# Patient Record
Sex: FEMALE | Race: WHITE | Employment: OTHER | URBAN - METROPOLITAN AREA
[De-identification: names, ages, dates, MRNs, and addresses within clinical notes are randomized per-mention and may not be internally consistent; named-entity substitution may affect disease eponyms.]

---

## 2018-07-10 ENCOUNTER — HOSPITAL ENCOUNTER (OUTPATIENT)
Dept: PHYSICAL THERAPY | Age: 73
Discharge: HOME | End: 2018-07-10
Payer: MEDICARE

## 2018-07-10 PROCEDURE — G8984 CARRY CURRENT STATUS: HCPCS

## 2018-07-10 PROCEDURE — 97110 THERAPEUTIC EXERCISES: CPT

## 2018-07-10 PROCEDURE — 97161 PT EVAL LOW COMPLEX 20 MIN: CPT

## 2018-07-10 PROCEDURE — 97530 THERAPEUTIC ACTIVITIES: CPT

## 2018-07-10 PROCEDURE — G8985 CARRY GOAL STATUS: HCPCS

## 2018-07-10 NOTE — PROGRESS NOTES
PT DAILY TREATMENT NOTE - [facility]     Patient Name: Reed Meade  Date:7/10/2018  : 1945  [x]  Patient  Verified  Payor: [place] MED[place] / Plan: [place] MEDICARE PART A & B / Product Type: Medicare /    In time:355  Out time:435  Total Treatment Time (min): 40  Total Timed Codes (min): 23  1:1 Treatment Time ( only): 40   Visit #: 1 of 12    Treatment Area: Right shoulder pain [M25.511]    SUBJECTIVE  Pain Level (0-10 scale): 0/10 seated at rest  Any medication changes, allergies to medications, adverse drug reactions, diagnosis change, or new procedure performed?: [x] No    [] Yes (see summary sheet for update)  Subjective functional status/changes:   [] No changes reported  See POC    OBJECTIVE    Modality rationale:    Min Type Additional Details    [] Estim:  []Unatt       []IFC  []Premod                        []Other:  []w/ice   []w/heat  Position:  Location:    [] Estim: []Att    []TENS instruct  []NMES                    []Other:  []w/US   []w/ice   []w/heat  Position:  Location:    []  Traction: [] Cervical       []Lumbar                       [] Prone          []Supine                       []Intermittent   []Continuous Lbs:  [] before manual  [] after manual    []  Ultrasound: []Continuous   [] Pulsed                           []1MHz   []3MHz W/cm2:  Location:    []  Iontophoresis with dexamethasone         Location: [] Take home patch   [] In clinic    []  Ice     []  heat  []  Ice massage  []  Laser   []  Anodyne Position:  Location:    []  Laser with stim  []  Other:  Position:  Location:    []  Vasopneumatic Device Pressure:       [] lo [] med [] hi   Temperature: [] lo [] med [] hi   [] Skin assessment post-treatment:  []intact []redness- no adverse reaction    []redness  adverse reaction:     17 min [x]Eval                  []Re-Eval       10 min Therapeutic Exercise:  [] See flow sheet :issued and reviewed initial HEP   Rationale: increase ROM and decrease pain to improve the patients ability to restore normal joint mobility for ADL's    13 min Therapeutic Activity:  []  See flow sheet : reviewed use of sling, discussed post op restrictions/precautions, reviewed positiioning   Rationale: decrease pain, honor healing process/protect surgical site  to improve the patients ability in prep for active use      min Neuromuscular Re-education:  []  See flow sheet :        min Manual Therapy:          min Gait Training:  ___ feet with ___ device on level surfaces with ___ level of assist   Rationale: With   [] TE   [] TA   [] neuro   [] other: Patient Education: [x] Review HEP    [] Progressed/Changed HEP based on:   [] positioning   [] body mechanics   [] transfers   [] heat/ice application    [] other:      Other Objective/Functional Measures:   Physical Therapy Evaluation - Shoulder  Pt s/p right shoulder arthroscopic surgery to include large RC tear repair, extensive debridement of biceps tendon rupture, and acromioplasty on 6/26/18 as a result of fall out of attic on 4/18/18. Note pt had right shoulder pain prior to fall and fall exacerbated symptoms Pt right hand dominant.   PLOF: chronic right shoulder pain with ADL's  Present Functional Limitations: cooking, cleaning, driving, bathing, dressing, reaching    Posture: [] Poor    [x] Fair    [] Good    Describe:    ROM:  [] Unable to assess at this time                                           AROM                                                              PROM   Left Right  Left Right   Flexion 155  Flexion  90   Extension   Extension     Scaption/  Scaption/ABD  100   ER @ 0 Degrees   ER @ 0 Degrees     ER @ 90 Degrees 70  ER @ 25 Degrees  0   IR @ 90 Degrees T10 level  IR @ 25 Degrees  70     End Feel / Painful Arc:    Strength:   [x] Unable to assess at this time secondary to post op status/MD order                                                                            L (1-5) R (1-5) Pain   Flexors   [] Yes   [] No Abductors   [] Yes   [] No   External Rotators   [] Yes   [] No   Internal Rotators   [] Yes   [] No   Supraspinatus   [] Yes   [] No   Serratus Anterior   [] Yes   [] No   Lower Trapezius   [] Yes   [] No   Elbow Flexion   [] Yes   [] No   Elbow Extension   [] Yes   [] No       Scapulohumoral Control / Rhythm:  Able to eccentrically lower with good control? Left: [x] Yes   [] No     Right: [] Yes   [x] No    Accessory Motions:    Palpation  [] Min  [] Mod  [] Severe    Location:  [] Min  [] Mod  [] Severe    Location:  [] Min  [] Mod  [] Severe    Location:    Optional Tests:    Sensation Left Right Reflexes Left Right   Biceps (C5)   Biceps (C5)     Moy Radial(C6-7)   Brachioradialis (C6)     Moy Ulnar(C8-T1)   Triceps (C7)       Adson's Test  [] Pos   [] Neg Yergason's Test [] Pos   [] Neg  Sandi's Test  [] Pos   [] Neg Hidalgo's Sign [] Pos   [] Neg  Neer's Test  [] Pos   [] Neg Clunk Test  [] Pos   [] Neg  Hawkin's Test  [] Pos   [] Neg AC Joint  [] Pos   [] Neg  Speed's Test  [] Pos   [] Neg SC Joint  [] Pos   [] Neg  Empty Can  [] Pos   [] Neg Pectoral Tightness [] Pos   [] Neg  Anterior Apprehension [] Pos   [] Neg   Posterior Apprehension [] Pos   [] Neg      Other Tests / Comments:          Pain Level (0-10 scale) post treatment: 0/10 at rest    ASSESSMENT/Changes in Function: see POC    Patient will continue to benefit from skilled PT services to modify and progress therapeutic interventions, address ROM deficits, address strength deficits, analyze and address soft tissue restrictions, analyze and cue movement patterns, analyze and modify body mechanics/ergonomics and assess and modify postural abnormalities to attain remaining goals.      [x]  See Plan of Care  []  See progress note/recertification  []  See Discharge Summary         Progress towards goals / Updated goals:  See POC    PLAN  []  Upgrade activities as tolerated     [x]  Continue plan of care  []  Update interventions per flow sheet []  Discharge due to:_  [x]  Other: PROM right shoulder only, manual techniques, mod prn     Sammi Govea, PT 7/10/2018  3:14 PM    Future Appointments  Date Time Provider Maxx Daniels   7/10/2018 4:00 PM Sammi Govea, PT [facility]

## 2018-07-10 NOTE — PROGRESS NOTES
[facility] at [address] [place] [address]  Phone: [phone]   Fax: [phone]    Plan of Care/ Statement of Necessity for Physical Therapy Services    Patient name: Saturnino Santiago Start of Care: 7/10/2018   Referral source: Wilman Broderick MD : 1945    Medical Diagnosis: Right shoulder pain [M25.511]   Onset Date:18 (DOS)    Treatment Diagnosis: right shoulder pain   Prior Hospitalization: see medical history Provider#: [id number]   Medications: Verified on Patient summary List    Comorbidities: DM, arthritis, HTN   Prior Level of Function: chronic right shoulder pain with ADL's      The Plan of Care and following information is based on the information from the initial evaluation. Assessment/ key information: Pt is a 72 yo female presenting to clinic s/p right shoulder arthroscopic surgery to include large RC tear repair, extensive debridement of biceps tendon rupture, and acromioplasty on 18 as a result of fall out of attic on 18. Note pt had right shoulder pain prior to fall and fall exacerbated symptoms Pt right hand dominant. On exam, pt has decreased right shoulder PROM, decreased shoulder strength and decreased functional use of right UE secondary to post op status. Signs/symptoms consistent with post surgical status. Pt would benefit from skilled PT intervention to address the findings.     Evaluation Complexity History MEDIUM  Complexity : 1-2 comorbidities / personal factors will impact the outcome/ POC ; Examination MEDIUM Complexity : 3 Standardized tests and measures addressing body structure, function, activity limitation and / or participation in recreation  ;Presentation LOW Complexity : Stable, uncomplicated  ;Clinical Decision Making MEDIUM Complexity : FOTO score of 26-74  Overall Complexity Rating: MEDIUM  Problem List: pain affecting function, decrease ROM, decrease strength, decrease ADL/ functional abilitiies, decrease activity tolerance and decrease flexibility/ joint mobility   Treatment Plan may include any combination of the following: Therapeutic exercise, Therapeutic activities, Neuromuscular re-education, Physical agent/modality, Manual therapy and Patient education  Patient / Family readiness to learn indicated by: asking questions, trying to perform skills and interest  Persons(s) to be included in education: patient (P)  Barriers to Learning/Limitations: None  Patient Goal (s): regain functional use of arm  Patient Self Reported Health Status: good  Rehabilitation Potential: good    Short Term Goals: To be accomplished in 2 weeks:  1. Patient will be independent and compliant with HEP to achieve other goals. Status at eval: issued and reviewed initial HEP  2. Increase right shoulder PROM flex >/= 130 to restore normal joint mobility for ADL's. Status at eval: 90  3. Increase right shoulder PROM ER >/= 30 to restore normal joint mobility for ADL's. Status at eval: 0 (@25)  4. Increase right shoulder PROM IR >/= 80 to restore normal joint mobility for ADL's. Status at eval: 70 (@25)    Long Term Goals: To be accomplished in 4 weeks:  1. Improve FOTO score by 15 points to indicate decreased pain with ADL's. Status at eval: assess on 2nd visit, FOTO system down at time of eval  2. Increase right shoulder PROM flex >/= 160 to restore normal joint mobility for ADL's. Status at eval: 90  3. Increase right shoulder PROM ER >/= 60 to restore normal joint mobility for ADL's. Status at eval: 0 (@25)  4. Progress with active and strengthening phase per MD protocol. Status at eval: PROM right shoulder only  Frequency / Duration: Patient to be seen 3 times per week for 4 weeks.     Patient/ Caregiver education and instruction: Diagnosis, prognosis, self care, activity modification, brace/ splint application, exercises and other positioning   [x]  Plan of care has been reviewed with PTA    G-Codes (GP)  Carry   Current CL= 60-79%    Goal  CJ= 20-39%    The severity rating is based on clinical judgment and the FOTO score. Certification Period: 7/10/18 - 9/7/18  Eloise Plata, PT 7/10/2018 3:19 PM  _____________________________________________________________________  I certify that the above Therapy Services are being furnished while the patient is under my care. I agree with the treatment plan and certify that this therapy is necessary.     Physician's Signature:____________________  Date:__________Time:______    Please sign and return to   [facility] at [address] [place] [address]  Phone: [phone]   Fax: [phone]

## 2018-07-13 ENCOUNTER — HOSPITAL ENCOUNTER (OUTPATIENT)
Dept: PHYSICAL THERAPY | Age: 73
Discharge: HOME | End: 2018-07-13
Payer: MEDICARE

## 2018-07-13 PROCEDURE — 97112 NEUROMUSCULAR REEDUCATION: CPT

## 2018-07-13 PROCEDURE — 97140 MANUAL THERAPY 1/> REGIONS: CPT

## 2018-07-13 PROCEDURE — 97016 VASOPNEUMATIC DEVICE THERAPY: CPT

## 2018-07-13 NOTE — PROGRESS NOTES
PT DAILY TREATMENT NOTE - [facility]     Patient Name: Valentino Morning  Date:2018  : 1945  [x]  Patient  Verified  Payor: Gary Signs / Plan: [place] MEDICARE PART A & B / Product Type: Medicare /    In time:1030  Out time:1110  Total Treatment Time (min): 40  Total Timed Codes (min): 40  1:1 Treatment Time ( [address] only): 30   Visit #: 2 of 12    Treatment Area: Right shoulder pain [M25.511]    SUBJECTIVE  Pain Level (0-10 scale): 0/10  Any medication changes, allergies to medications, adverse drug reactions, diagnosis change, or new procedure performed?: [x] No    [] Yes (see summary sheet for update)  Subjective functional status/changes:   [] No changes reported  Pt reports that she is getting her ice machine today.     OBJECTIVE    Modality rationale: decrease inflammation and decrease pain to improve the patients ability to compelte ADLs   Min Type Additional Details    [] Estim:  []Unatt       []IFC  []Premod                        []Other:  []w/ice   []w/heat  Position:  Location:    [] Estim: []Att    []TENS instruct  []NMES                    []Other:  []w/US   []w/ice   []w/heat  Position:  Location:    []  Traction: [] Cervical       []Lumbar                       [] Prone          []Supine                       []Intermittent   []Continuous Lbs:  [] before manual  [] after manual    []  Ultrasound: []Continuous   [] Pulsed                           []1MHz   []3MHz W/cm2:  Location:    []  Iontophoresis with dexamethasone         Location: [] Take home patch   [] In clinic    []  Ice     []  heat  []  Ice massage  []  Laser   []  Anodyne Position:  Location:    []  Laser with stim  []  Other:  Position:  Location:   10 [x]  Vasopneumatic Device Pressure:       [] lo [x] med [] hi   Temperature: [] lo [x] med [] hi   [x] Skin assessment post-treatment:  [x]intact [x]redness- no adverse reaction    []redness  adverse reaction:       20 min Neuromuscular Re-education:  []  See flow sheet :   Rationale: increase ROM, improve coordination and increase proprioception  to improve the patients ability to complete ADLs    10 min Manual Therapy:  PROM, MFR to anterior/posterior  Right shoulder   Rationale: decrease pain, increase ROM, increase tissue extensibility, decrease edema  and decrease trigger points to complete ADls          With   [] TE   [] TA   [] neuro   [] other: Patient Education: [x] Review HEP    [] Progressed/Changed HEP based on:   [] positioning   [] body mechanics   [] transfers   [] heat/ice application    [] other:      Other Objective/Functional Measures:     Pain Level (0-10 scale) post treatment:0/10    ASSESSMENT/Changes in Function:Pt demonstrates edema and immobility in right shoulder. She required cueing for form with pendulums, reducing active motion. Patient will continue to benefit from skilled PT services to address functional mobility deficits, address ROM deficits, address strength deficits, analyze and address soft tissue restrictions, analyze and cue movement patterns, analyze and modify body mechanics/ergonomics and assess and modify postural abnormalities to attain remaining goals. []  See Plan of Care  []  See progress note/recertification  []  See Discharge Summary         Progress towards goals / Updated goals:  Short Term Goals: To be accomplished in 2 weeks:  1. Patient will be independent and compliant with HEP to achieve other goals. Status at eval: issued and reviewed initial HEP  Current: reviewed  2. Increase right shoulder PROM flex >/= 130 to restore normal joint mobility for ADL's. Status at eval: 90  3. Increase right shoulder PROM ER >/= 30 to restore normal joint mobility for ADL's. Status at eval: 0 (@25)  4. Increase right shoulder PROM IR >/= 80 to restore normal joint mobility for ADL's. Status at eval: 70 (@25)     Long Term Goals: To be accomplished in 4 weeks:  1. Improve FOTO score by 15 points to indicate decreased pain with ADL's.   Status at eval: assess on 2nd visit, FOTO system down at time of eval  2. Increase right shoulder PROM flex >/= 160 to restore normal joint mobility for ADL's. Status at eval: 90  3. Increase right shoulder PROM ER >/= 60 to restore normal joint mobility for ADL's. Status at eval: 0 (@25)  4. Progress with active and strengthening phase per MD protocol.   Status at eval: PROM right shoulder only  Current; Progressing    PLAN  []  Upgrade activities as tolerated     [x]  Continue plan of care  []  Update interventions per flow sheet       []  Discharge due to:_  []  Other:_      Elliott Vargas PTA 7/13/2018  10:11 AM    Future Appointments  Date Time Provider Maxx Daniels   7/13/2018 10:30 AM LUCY Cherry [facility]   7/16/2018 9:30 AM Cassidy [facility]   7/18/2018 10:00 AM [facility] PT [facility]   7/20/2018 3:30 PM Elliott Vargas PTA [facility]   7/24/2018 3:30 PM LUCY [facility]PTV[facility]   7/25/2018 9:00 AM Maria Elena Wall, PT [facility]   7/27/2018 10:45 AM LUCY [facility]   8/1/2018 9:15 AM LUCY [facility]PTV[facility]   8/3/2018 9:30 AM Elliott Vargas PTA [facility]

## 2018-07-16 ENCOUNTER — HOSPITAL ENCOUNTER (OUTPATIENT)
Dept: PHYSICAL THERAPY | Age: 73
Discharge: HOME | End: 2018-07-16
Payer: MEDICARE

## 2018-07-16 PROCEDURE — 97140 MANUAL THERAPY 1/> REGIONS: CPT | Performed by: PHYSICAL THERAPIST

## 2018-07-16 PROCEDURE — 97112 NEUROMUSCULAR REEDUCATION: CPT | Performed by: PHYSICAL THERAPIST

## 2018-07-16 NOTE — PROGRESS NOTES
PT DAILY TREATMENT NOTE - [facility]     Patient Name: John Sheppard  Date:2018  : 1945  [x]  Patient  Verified  Payor: Abundio Drafts / Plan: [place] MEDICARE PART A & B / Product Type: Medicare /    In time:9:32  Out time:10:35  Total Treatment Time (min): 45  Total Timed Codes (min): 35  1:1 Treatment Time ( [address] only): 35  Visit #: 3 of 12    Treatment Area: Right shoulder pain [M25.511]    SUBJECTIVE  Pain Level (0-10 scale): 0  Any medication changes, allergies to medications, adverse drug reactions, diagnosis change, or new procedure performed?: [x] No    [] Yes (see summary sheet for update)  Subjective functional status/changes:   [] No changes reported  Doing well but still pain from surgery. Still sleeps in recliner - cannot get comfortable in bed. Notes left sided \"crawling\" sensation into thumb and index.       OBJECTIVE    Modality rationale: decrease inflammation and decrease pain to improve the patients ability to compelte ADLs   Min Type Additional Details     [] Estim:  []Unatt       []IFC  []Premod                        []Other:  []w/ice   []w/heat  Position:  Location:     [] Estim: []Att    []TENS instruct  []NMES                    []Other:  []w/US   []w/ice   []w/heat  Position:  Location:     []  Traction: [] Cervical       []Lumbar                       [] Prone          []Supine                       []Intermittent   []Continuous Lbs:  [] before manual  [] after manual     []  Ultrasound: []Continuous   [] Pulsed                           []1MHz   []3MHz W/cm2:  Location:     []  Iontophoresis with dexamethasone         Location: [] Take home patch   [] In clinic     []  Ice     []  heat  []  Ice massage  []  Laser   []  Anodyne Position:  Location:     []  Laser with stim  []  Other:  Position:  Location:   10 [x]  Vasopneumatic Device Pressure:       [] lo [x] med [] hi   Temperature: [] lo [x] med [] hi   [x] Skin assessment post-treatment:  [x]intact [x]redness- no adverse reaction    []redness  adverse reaction:         20 min Neuromuscular Re-education:  []  See flow sheet :   Rationale: increase ROM, improve coordination and increase proprioception  to improve the patients ability to complete ADLs     15 min Manual Therapy:  scapular mobs in left SL, PROM with arm at side for ER/IR   Rationale: decrease pain, increase ROM, increase tissue extensibility, decrease edema  and decrease trigger points to complete ADls      With   [] TE   [] TA   [] neuro   [] other: Patient Education: [x] Review HEP    [] Progressed/Changed HEP based on:   [] positioning   [] body mechanics   [] transfers   [] heat/ice application    [] other:       Other Objective/Functional Measures:            PROM:  Shoulder flexion: 0-120; ER with arm at side:  Neutral        New onset left radicular sx - educated to correct posture and add cervical retraction - see HEP in chart     Pain Level (0-10 scale) post treatment:0/10     ASSESSMENT/Changes in Function:  Great progress with PROM shoulder flexion tp 120; still unable to sleep unless in recliner. Walking and staying as active as she can. Patient will continue to benefit from skilled PT services to address functional mobility deficits, address ROM deficits, address strength deficits, analyze and address soft tissue restrictions, analyze and cue movement patterns, analyze and modify body mechanics/ergonomics and assess and modify postural abnormalities to attain remaining goals.      []  See Plan of Care  []  See progress note/recertification  []  See Discharge Summary      Progress towards goals / Updated goals:  Short Term Goals: To be accomplished in 2 weeks:  1. Patient will be independent and compliant with HEP to achieve other goals. Status at eval: issued and reviewed initial HEP  Current: met  2. Increase right shoulder PROM flex >/= 130 to restore normal joint mobility for ADL's. Status at eval: 90  Current:120  3.  Increase right shoulder PROM ER >/= 30 to restore normal joint mobility for ADL's. Status at eval: 0 (@25)   Current:  0 at 25  4. Increase right shoulder PROM IR >/= 80 to restore normal joint mobility for ADL's. Status at eval: 70 (@25)  Current: 70 at 22      Long Term Goals: To be accomplished in 4 weeks:  1. Improve FOTO score by 15 points to indicate decreased pain with ADL's. Status at eval: assess on 2nd visit, FOTO system down at time of eval  2. Increase right shoulder PROM flex >/= 160 to restore normal joint mobility for ADL's. Status at eval: 90  3. Increase right shoulder PROM ER >/= 60 to restore normal joint mobility for ADL's. Status at eval: 0 (@25)  4. Progress with active and strengthening phase per MD protocol.   Status at eval: PROM right shoulder only  Current; Progressing     PLAN  [x]  Upgrade activities as tolerated     [x]  Continue plan of care  []  Update interventions per flow sheet       []  Discharge due to:_  []  Other:_  Check status with retraction and left arm sx  Tisha Vasques, PT 7/16/2018  10:35 AM    Future Appointments  Date Time Provider Maxx Daniels   7/18/2018 10:00 AM Cassidy [facility]   7/20/2018 3:30 PM LUCY Henning [facility]   7/24/2018 3:30 PM [facility]UCY [facility]   7/25/2018 9:00 AM JAYLEN Wood [facility]   7/27/2018 10:45 AM [facility]UCY [facility]   8/1/2018 9:15 AM [facility]UCY [facility]   8/3/2018 9:30 AM LUCY Hen[facility]

## 2018-07-18 ENCOUNTER — HOSPITAL ENCOUNTER (OUTPATIENT)
Dept: PHYSICAL THERAPY | Age: 73
Discharge: HOME | End: 2018-07-18
Payer: MEDICARE

## 2018-07-18 PROCEDURE — 97140 MANUAL THERAPY 1/> REGIONS: CPT | Performed by: PHYSICAL THERAPIST

## 2018-07-18 PROCEDURE — 97110 THERAPEUTIC EXERCISES: CPT | Performed by: PHYSICAL THERAPIST

## 2018-07-18 NOTE — PROGRESS NOTES
PT DAILY TREATMENT NOTE - [facility]     Patient Name: Tone Joshi  Date:2018  : 1945  [x]  Patient  Verified  Payor: Marbella Gonsalez / Plan: [place] MED[place] PART [place] & B / Product Type: Medicare /    In time:10:00  Out time:10:45  Total Treatment Time (min): 45  Total Timed Codes (min): 35  1:1 Treatment Time ( [address] only): 25   Visit #: 4 of 12    Treatment Area: Right shoulder pain [M25.511]    SUBJECTIVE  Pain Level (0-10 scale):  0  Any medication changes, allergies to medications, adverse drug reactions, diagnosis change, or new procedure performed?: [x] No    [] Yes (see summary sheet for update)  Subjective functional status/changes:   [] No changes reported  Doing well with no pain but sleeping is difficult. Sees MD on Friday.     OBJECTIVE          Modality rationale: decrease inflammation and decrease pain to improve the patients ability to compelte ADLs   Min Type Additional Details      [] Estim:  []Unatt       []IFC  []Premod                        []Other:  []w/ice   []w/heat  Position:  Location:      [] Estim: []Att    []TENS instruct  []NMES                    []Other:  []w/US   []w/ice   []w/heat  Position:  Location:      []  Traction: [] Cervical       []Lumbar                       [] Prone          []Supine                       []Intermittent   []Continuous Lbs:  [] before manual  [] after manual      []  Ultrasound: []Continuous   [] Pulsed                           []1MHz   []3MHz W/cm2:  Location:      []  Iontophoresis with dexamethasone         Location: [] Take home patch   [] In clinic      []  Ice     []  heat  []  Ice massage  []  Laser   []  Anodyne Position:  Location:      []  Laser with stim  []  Other:  Position:  Location:   10 [x]  Vasopneumatic Device Pressure:       [] lo [x] med [] hi   Temperature: [] lo [x] med [] hi   [x] Skin assessment post-treatment:  [x]intact [x]redness- no adverse reaction    []redness  adverse reaction:           20 min Neuromuscular Re-education:  []  See flow sheet :   Rationale: increase ROM, improve coordination and increase proprioception  to improve the patients ability to complete ADLs      15 min Manual Therapy:  scapular mobs in left SL, PROM with arm at side for ER/IR   Rationale: decrease pain, increase ROM, increase tissue extensibility, decrease edema  and decrease trigger points to complete ADls              With   [] TE   [] TA   [] neuro   [] other: Patient Education: [x] Review HEP    [] Progressed/Changed HEP based on:   [] positioning   [] body mechanics   [] transfers   [] heat/ice application    [] other:      Other Objective/Functional Measures:     PROM:  Shoulder flexion: 0-120; scaption: 0-125; ER with arm at side:  Neutral         Pain Level (0-10 scale) post treatment: 0    ASSESSMENT/Changes in Function: Good progress with PROM per protocol - still using sling, not driving - struggles to sleep and uses recliner. Patient will continue to benefit from skilled PT services to modify and progress therapeutic interventions, address functional mobility deficits, address ROM deficits, address strength deficits, analyze and address soft tissue restrictions, analyze and cue movement patterns, assess and modify postural abnormalities and instruct in home and community integration to attain remaining goals. []  See Plan of Care  []  See progress note/recertification  []  See Discharge Summary           Progress towards goals / Updated goals:  Short Term Goals: To be accomplished in 2 weeks:  1. Patient will be independent and compliant with HEP to achieve other goals. Status at eval: issued and reviewed initial HEP  Current: met  2. Increase right shoulder PROM flex >/= 130 to restore normal joint mobility for ADL's. Status at eval: 90  Current:120  3. Increase right shoulder PROM ER >/= 30 to restore normal joint mobility for ADL's. Status at eval: 0 (@25)   Current:  0 at 25  4.  Increase right shoulder PROM IR >/= 80 to restore normal joint mobility for ADL's. Status at eval: 70 (@25)  Current: 70 at 22      Long Term Goals: To be accomplished in 4 weeks:  1. Improve FOTO score by 15 points to indicate decreased pain with ADL's. Status at eval: assess on 2nd visit, FOTO system down at time of eval  2. Increase right shoulder PROM flex >/= 160 to restore normal joint mobility for ADL's. Status at eval: 90  3. Increase right shoulder PROM ER >/= 60 to restore normal joint mobility for ADL's. Status at eval: 0 (@25)  4. Progress with active and strengthening phase per MD protocol.   Status at eval: PROM right shoulder only  Current; Progressing       PLAN  [x]  Upgrade activities as tolerated     [x]  Continue plan of care  []  Update interventions per flow sheet       []  Discharge due to:_  []  Other:_  Sees MD on Friday    Tanya Reynolds PT 7/18/2018  10:14 AM    Future Appointments  Date Time Provider Maxx Daniels   7/20/2018 3:30 PM Joan Spatz, [place] [facility]PT[facility] THE [facility]   7/24/2018 3:30 PM Joan Spatz, PTA [facility]PTVY THE [facility]   7/25/2018 9:00 AM Dayana Remy PT [facility] THE [facility]   7/27/2018 10:45 AM Joan Spatz, PTA [facility]   8/1/2018 9:15 AM Joan Spatz, PTA [facility] THE [facility]   8/3/2018 9:30 AM Joan Spatz, PTA [facility] THE [facility]

## 2018-07-20 ENCOUNTER — HOSPITAL ENCOUNTER (OUTPATIENT)
Dept: PHYSICAL THERAPY | Age: 73
Discharge: HOME | End: 2018-07-20
Payer: MEDICARE

## 2018-07-20 PROCEDURE — 97140 MANUAL THERAPY 1/> REGIONS: CPT

## 2018-07-20 PROCEDURE — 97112 NEUROMUSCULAR REEDUCATION: CPT

## 2018-07-20 PROCEDURE — 97016 VASOPNEUMATIC DEVICE THERAPY: CPT

## 2018-07-20 NOTE — PROGRESS NOTES
PT DAILY TREATMENT NOTE - [facility]     Patient Name: Chon Mckinley  Date:2018  : 1945  [x]  Patient  Verified  Payor: [place] MEDICARE / Plan: [place] MEDICARE PART A & B / Product Type: Medicare /    In time:330  Out time:412  Total Treatment Time (min): 42  Total Timed Codes (min): 32  1:1 Treatment Time ( [address] only): 32   Visit #: 5 of 12    Treatment Area: Right shoulder pain [M25.511]    SUBJECTIVE  Pain Level (0-10 scale): 0/10  Any medication changes, allergies to medications, adverse drug reactions, diagnosis change, or new procedure performed?: [x] No    [] Yes (see summary sheet for update)  Subjective functional status/changes:   [] No changes reported  Pt reports that she only has pain when she tries to sleep at night.   She reports that doctor removed her \"bump\"    OBJECTIVE    Modality rationale: decrease inflammation and decrease pain to improve the patients ability to complete ADls   Min Type Additional Details    [] Estim:  []Unatt       []IFC  []Premod                        []Other:  []w/ice   []w/heat  Position:  Location:    [] Estim: []Att    []TENS instruct  []NMES                    []Other:  []w/US   []w/ice   []w/heat  Position:  Location:    []  Traction: [] Cervical       []Lumbar                       [] Prone          []Supine                       []Intermittent   []Continuous Lbs:  [] before manual  [] after manual    []  Ultrasound: []Continuous   [] Pulsed                           []1MHz   []3MHz W/cm2:  Location:    []  Iontophoresis with dexamethasone         Location: [] Take home patch   [] In clinic    []  Ice     []  heat  []  Ice massage  []  Laser   []  Anodyne Position:  Location:    []  Laser with stim  []  Other:  Position:  Location:   10 [x]  Vasopneumatic Device Pressure:       [] lo [x] med [] hi   Temperature: [] lo [x] med [] hi   [x] Skin assessment post-treatment:  [x]intact [x]redness- no adverse reaction    []redness  adverse reaction:     22 min Neuromuscular Re-education:  []  See flow sheet : facilitate scapular stability   Rationale: increase ROM, improve coordination and increase proprioception  to improve the patients ability to complete ADLs    10 min Manual Therapy:  PROM, MFR to bicep belly, scapular mobes   Rationale: decrease pain, increase ROM, increase tissue extensibility, decrease edema  and decrease trigger points to complete ADls          With   [] TE   [] TA   [x] neuro   [] other: Patient Education: [x] Review HEP    [] Progressed/Changed HEP based on:   [x] positioning   [x] body mechanics   [] transfers   [] heat/ice application    [] other:      Other Objective/Functional Measures:     Pain Level (0-10 scale) post treatment: 0/10    ASSESSMENT/Changes in Function: Pt demonstrates restrictions in anterior shoulder and bicep belly on right shoulder. Pt has difficulty relaxing during treatment requiring numerous cues to relax. Patient will continue to benefit from skilled PT services to address functional mobility deficits, address ROM deficits, address strength deficits, analyze and address soft tissue restrictions, analyze and cue movement patterns, analyze and modify body mechanics/ergonomics and assess and modify postural abnormalities to attain remaining goals. []  See Plan of Care  []  See progress note/recertification  []  See Discharge Summary         Progress towards goals / Updated goals:  Short Term Goals: To be accomplished in 2 weeks:  1. Patient will be independent and compliant with HEP to achieve other goals. Status at eval: issued and reviewed initial HEP  Current: met  2. Increase right shoulder PROM flex >/= 130 to restore normal joint mobility for ADL's. Status at eval: 90  Current:120  3. Increase right shoulder PROM ER >/= 30 to restore normal joint mobility for ADL's. Status at eval: 0 (@25)   Current:  0 at 25  4. Increase right shoulder PROM IR >/= 80 to restore normal joint mobility for ADL's.   Status at eval: 70 (@25)  Current: 70 at [address] be accomplished in 4 weeks:  1. Improve FOTO score by 15 points to indicate decreased pain with ADL's. Status at eval: assess on 2nd visit, FOTO system down at time of eval  Current; 6 point increase since start of care. 2. Increase right shoulder PROM flex >/= 160 to restore normal joint mobility for ADL's. Status at eval: 90  3. Increase right shoulder PROM ER >/= 60 to restore normal joint mobility for ADL's. Status at eval: 0 (@25)  4. Progress with active and strengthening phase per MD protocol.   Status at eval: PROM right shoulder only  Current; Progressing    PLAN  []  Upgrade activities as tolerated     [x]  Continue plan of care  []  Update interventions per flow sheet       []  Discharge due to:_  []  Other:_      Elliott Vargas PTA 7/20/2018  3:26 PM    Future Appointments  Date Time Provider Maxx Daniels   7/20/2018 3:30 PM Sydnie CherryHPTCLAIRE THE [facility]   7/24/2018 3:30 PM LUCY CherryHPTCLAIRE THE [facility]   7/25/2018 9:00 AM JAYLEN ShoreHPTVENKATAY THE [facility]   7/27/2018 10:45 AM LUCY Cherry THE [facility]   8/1/2018 9:15 AM LUCY CherryTCLAIRE THE [facility]   8/3/2018 9:30 AM LUCY CherryHPTCLAIRE THE [facility]

## 2018-07-24 ENCOUNTER — HOSPITAL ENCOUNTER (OUTPATIENT)
Dept: PHYSICAL THERAPY | Age: 73
Discharge: HOME | End: 2018-07-24
Payer: MEDICARE

## 2018-07-24 PROCEDURE — 97140 MANUAL THERAPY 1/> REGIONS: CPT

## 2018-07-24 PROCEDURE — 97016 VASOPNEUMATIC DEVICE THERAPY: CPT

## 2018-07-24 PROCEDURE — 97112 NEUROMUSCULAR REEDUCATION: CPT

## 2018-07-24 NOTE — PROGRESS NOTES
PT DAILY TREATMENT NOTE - [facility]     Patient Name: Osvaldo Ravi  Date:2018  : 1945  [x]  Patient  Verified  Payor: Zac Perish / Plan: [place] MEDICARE PART A & B / Product Type: Medicare /    In time:333  Out time:413  Total Treatment Time (min): 40  Total Timed Codes (min): 30  1:1 Treatment Time ( only): 30   Visit #: 6 of 12    Treatment Area: Right shoulder pain [M25.511]    SUBJECTIVE  Pain Level (0-10 scale): 0/10  Any medication changes, allergies to medications, adverse drug reactions, diagnosis change, or new procedure performed?: [x] No    [] Yes (see summary sheet for update)  Subjective functional status/changes:   [x] No changes reported      OBJECTIVE    Modality rationale: decrease inflammation and decrease pain to improve the patients ability to complete ADls   Min Type Additional Details    [] Estim:  []Unatt       []IFC  []Premod                        []Other:  []w/ice   []w/heat  Position:  Location:    [] Estim: []Att    []TENS instruct  []NMES                    []Other:  []w/US   []w/ice   []w/heat  Position:  Location:    []  Traction: [] Cervical       []Lumbar                       [] Prone          []Supine                       []Intermittent   []Continuous Lbs:  [] before manual  [] after manual    []  Ultrasound: []Continuous   [] Pulsed                           []1MHz   []3MHz W/cm2:  Location:    []  Iontophoresis with dexamethasone         Location: [] Take home patch   [] In clinic    []  Ice     []  heat  []  Ice massage  []  Laser   []  Anodyne Position:  Location:    []  Laser with stim  []  Other:  Position:  Location:   10 [x]  Vasopneumatic Device Pressure:       [] lo [x] med [] hi   Temperature: [] lo [x] med [] hi   [x] Skin assessment post-treatment:  [x]intact [x]redness- no adverse reaction    []redness     20 min Neuromuscular Re-education:  []  See flow sheet :   Rationale: increase ROM, increase strength, improve coordination and increase proprioception  to improve the patients ability to complete ADLs    10 min Manual Therapy:  MFR to right anterior/posterior shoulder, PROM, myofascial arm pull   Rationale: decrease pain, increase ROM, increase tissue extensibility, decrease edema  and decrease trigger points to complete ADLs          With   [] TE   [] TA   [x] neuro   [] other: Patient Education: [x] Review HEP    [] Progressed/Changed HEP based on:   [x] positioning   [x] body mechanics   [] transfers   [x] heat/ice application    [] other:      Other Objective/Functional Measures:       Pain Level (0-10 scale) post treatment: 0/10    ASSESSMENT/Changes in Function: Pt demonstrates decreased edema and improved PROM. She continues to have difficulty relaxing during manual work limiting ability to stretch properly. Patient will continue to benefit from skilled PT services to address functional mobility deficits, address ROM deficits, address strength deficits, analyze and address soft tissue restrictions, analyze and cue movement patterns, analyze and modify body mechanics/ergonomics and assess and modify postural abnormalities to attain remaining goals. []  See Plan of Care  []  See progress note/recertification  []  See Discharge Summary         Progress towards goals / Updated goals:  Short Term Goals: To be accomplished in 2 weeks:  1. Patient will be independent and compliant with HEP to achieve other goals. Status at eval: issued and reviewed initial HEP  Current: met  2. Increase right shoulder PROM flex >/= 130 to restore normal joint mobility for ADL's. Status at eval: 90  Current:120  3. Increase right shoulder PROM ER >/= 30 to restore normal joint mobility for ADL's. Status at eval: 0 (@25)   Current:  0 at 25  4. Increase right shoulder PROM IR >/= 80 to restore normal joint mobility for ADL's. Status at eval: 70 (@25)  Current: 70 at 22      Long Term Goals: To be accomplished in 4 weeks:  1.  Improve FOTO score by 15 points to indicate decreased pain with ADL's. Status at eval: assess on 2nd visit, FOTO system down at time of eval  Current; 6 point increase since start of care. 2. Increase right shoulder PROM flex >/= 160 to restore normal joint mobility for ADL's. Status at eval: 90  3. Increase right shoulder PROM ER >/= 60 to restore normal joint mobility for ADL's. Status at eval: 0 (@25)  4. Progress with active and strengthening phase per MD protocol.   Status at eval: PROM right shoulder only  Current; Progressing       PLAN  []  Upgrade activities as tolerated     []  Continue plan of care  []  Update interventions per flow sheet       []  Discharge due to:_  []  Other:_      Babak Donaldson PTA 7/24/2018  4:07 PM    Future Appointments  Date Time Provider Maxx aDniels   7/25/2018 9:00 AM JAYLEN Coates THE [facility]   7/27/2018 10:45 AM LUCY Leger THE [facility]   8/1/2018 9:15 AM LUCY Leger THE [facility]   8/3/2018 9:30 AM LUCY Leger THE [facility]

## 2018-07-25 ENCOUNTER — HOSPITAL ENCOUNTER (OUTPATIENT)
Dept: PHYSICAL THERAPY | Age: 73
Discharge: HOME | End: 2018-07-25
Payer: MEDICARE

## 2018-07-25 PROCEDURE — 97016 VASOPNEUMATIC DEVICE THERAPY: CPT

## 2018-07-25 PROCEDURE — 97110 THERAPEUTIC EXERCISES: CPT

## 2018-07-25 PROCEDURE — 97140 MANUAL THERAPY 1/> REGIONS: CPT

## 2018-07-25 NOTE — PROGRESS NOTES
PT DAILY TREATMENT NOTE - [facility]     Patient Name: Nancy Cowart  Date:2018  : 1945  [x]  Patient  Verified  Payor: [place] MEDICARE / Plan: [place] MEDICARE PART A & B / Product Type: Medicare /    In time:850  Out time:930  Total Treatment Time (min): 40  Total Timed Codes (min): 30  1:1 Treatment Time ( [address] only): 30   Visit #: 7 of 12    Treatment Area: Right shoulder pain [M25.511]    SUBJECTIVE  Pain Level (0-10 scale): 0/10  Any medication changes, allergies to medications, adverse drug reactions, diagnosis change, or new procedure performed?: [x] No    [] Yes (see summary sheet for update)  Subjective functional status/changes:   [] No changes reported  This sling is bothering my neck.     OBJECTIVE    Modality rationale: decrease edema, decrease inflammation and decrease pain to improve the patients ability to increase activity/position tolerance   Min Type Additional Details    [] Estim:  []Unatt       []IFC  []Premod                        []Other:  []w/ice   []w/heat  Position:  Location:    [] Estim: []Att    []TENS instruct  []NMES                    []Other:  []w/US   []w/ice   []w/heat  Position:  Location:    []  Traction: [] Cervical       []Lumbar                       [] Prone          []Supine                       []Intermittent   []Continuous Lbs:  [] before manual  [] after manual    []  Ultrasound: []Continuous   [] Pulsed                           []1MHz   []3MHz W/cm2:  Location:    []  Iontophoresis with dexamethasone         Location: [] Take home patch   [] In clinic    []  Ice     []  heat  []  Ice massage  []  Laser   []  Anodyne Position:  Location:    []  Laser with stim  []  Other:  Position:  Location:   10 [x]  Vasopneumatic Device Pressure:       [] lo [x] med [] hi   Temperature: [x] lo [] med [] hi   [] Skin assessment post-treatment:  []intact []redness- no adverse reaction    []redness  adverse reaction:      min []Eval                  []Re-Eval       20 min Therapeutic Exercise:  [x] See flow sheet :   Rationale: increase ROM and increase strength to improve the patients ability to in prep for active use     min Therapeutic Activity:  []  See flow sheet :         min Neuromuscular Re-education:  []  See flow sheet :       10 min Manual Therapy:  MFR arm pull, right shoulder PROM, joint oscillation/distarction   Rationale: decrease pain, increase ROM and increase tissue extensibility to restore normal joint mobility for ADL's     min Gait Training:  ___ feet with ___ device on level surfaces with ___ level of assist   Rationale: With   [] TE   [] TA   [] neuro   [] other: Patient Education: [x] Review HEP    [] Progressed/Changed HEP based on:   [] positioning   [] body mechanics   [] transfers   [] heat/ice application    [] other:      Other Objective/Functional Measures: none taken today     Pain Level (0-10 scale) post treatment: 0/10    ASSESSMENT/Changes in Function: No new progress. Patient will continue to benefit from skilled PT services to modify and progress therapeutic interventions, address ROM deficits, address strength deficits, analyze and address soft tissue restrictions, analyze and cue movement patterns, analyze and modify body mechanics/ergonomics and assess and modify postural abnormalities to attain remaining goals. []  See Plan of Care  []  See progress note/recertification  []  See Discharge Summary         Progress towards goals / Updated goals:  Short Term Goals: To be accomplished in 2 weeks:  1. Patient will be independent and compliant with HEP to achieve other goals. Status at eval: issued and reviewed initial HEP  Current: met  2. Increase right shoulder PROM flex >/= 130 to restore normal joint mobility for ADL's. Status at eval: 90  Current:120  3. Increase right shoulder PROM ER >/= 30 to restore normal joint mobility for ADL's. Status at eval: 0 (@25)   Current:  0 at 25  4.  Increase right shoulder PROM IR >/= 80 to restore normal joint mobility for ADL's. Status at eval: 70 (@25)  Current: 70 at 22      Long Term Goals: To be accomplished in 4 weeks:  1. Improve FOTO score by 15 points to indicate decreased pain with ADL's. Status at eval: assess on 2nd visit, FOTO system down at time of eval  Current; 6 point increase since start of care. 2. Increase right shoulder PROM flex >/= 160 to restore normal joint mobility for ADL's. Status at eval: 90  3. Increase right shoulder PROM ER >/= 60 to restore normal joint mobility for ADL's. Status at eval: 0 (@25)  4. Progress with active and strengthening phase per MD protocol.   Status at eval: PROM right shoulder only  Current; Progressing       PLAN  []  Upgrade activities as tolerated     [x]  Continue plan of care  []  Update interventions per flow sheet       []  Discharge due to:_  []  Other:_      Mikael Rivera PT 7/25/2018  8:26 AM    Future Appointments  Date Time Provider Maxx Daniels   7/25/2018 9:00 AM JAYLEN Kan THE [facility]   7/27/2018 10:45 AM LUCY HaddadHPTCLAIRE THE [facility]   8/1/2018 9:15 AM LUCY HaddadHPTCLAIRE THE [facility]   8/3/2018 9:30 AM LUCY HaddadHPTCLAIRE THE [facility]

## 2018-07-27 ENCOUNTER — HOSPITAL ENCOUNTER (OUTPATIENT)
Dept: PHYSICAL THERAPY | Age: 73
Discharge: HOME | End: 2018-07-27
Payer: MEDICARE

## 2018-07-27 PROCEDURE — 97112 NEUROMUSCULAR REEDUCATION: CPT

## 2018-07-27 PROCEDURE — 97016 VASOPNEUMATIC DEVICE THERAPY: CPT

## 2018-07-27 PROCEDURE — 97140 MANUAL THERAPY 1/> REGIONS: CPT

## 2018-07-27 NOTE — PROGRESS NOTES
PT DAILY TREATMENT NOTE - [facility]  Patient Name: Griselda Goff Date:2018 : 1945 [x]  Patient  Verified Payor: [place] / Plan: Todd Olmstead y / Product Type: Medicare / In time:1045  Out time:1135 Total Treatment Time (min): 50 Total Timed Codes (min): 40 
1:1 Treatment Time ( only): 40 Visit #: 8 of 12 Treatment Area: Right shoulder pain [M25.511] SUBJECTIVE Pain Level (0-10 scale): 2/10 Any medication changes, allergies to medications, adverse drug reactions, diagnosis change, or new procedure performed?: [x] No    [] Yes (see summary sheet for update) Subjective functional status/changes:   [] No changes reported Pt reports that she has had a dull tooth ache feeling in the front of her shoulder since the other day. She reports icing but it not helping much OBJECTIVE Modality rationale: decrease inflammation and decrease pain to improve the patients ability to complete ADls Min Type Additional Details  
 [] Estim:  []Unatt       []IFC  []Premod []Other:  []w/ice   []w/heat Position: Location:  
 [] Estim: []Att    []TENS instruct  []NMES []Other:  []w/US   []w/ice   []w/heat Position: Location:  
 []  Traction: [] Cervical       []Lumbar 
                     [] Prone          []Supine []Intermittent   []Continuous Lbs: 
[] before manual 
[] after manual  
 []  Ultrasound: []Continuous   [] Pulsed []1MHz   []3MHz W/cm2: 
Location:  
 []  Iontophoresis with dexamethasone Location: [] Take home patch  
[] In clinic  
 []  Ice     []  heat 
[]  Ice massage 
[]  Laser  
[]  Anodyne Position: Location:  
 []  Laser with stim 
[]  Other:  Position: Location:  
10 [x]  Vasopneumatic Device Pressure:       [] lo [x] med [] hi  
Temperature: [] lo [x] med [] hi  
[x] Skin assessment post-treatment:  [x]intact [x]redness- no adverse reaction 
  []redness  adverse reaction:  
 
  
30 min Neuromuscular Re-education:  []  See flow sheet :facilitate scapular strength Rationale: increase ROM, increase strength, improve coordination and increase proprioception  to improve the patients ability to complete ADls 10 min Manual Therapy:  SOR, PROM right shoulder, MFR to anterior/posterior shoulder, scapular mobes Rationale: decrease pain, increase ROM, increase tissue extensibility, decrease edema  and decrease trigger points to complete ADLs With 
 [] TE 
 [] TA [x] neuro 
 [] other: Patient Education: [x] Review HEP [] Progressed/Changed HEP based on:  
[x] positioning   [x] body mechanics   [] transfers   [x] heat/ice application   
[] other:   
 
Other Objective/Functional Measures:   
 
Pain Level (0-10 scale) post treatment: 0/10 ASSESSMENT/Changes in Function:Pt demonstrates limited mobility through right scapula with noted edema in anterior shoulder. Pt continues to have difficulty relaxing the shoulder during manual work and requires verbal cueing to not actively move shoulder during exercise. Patient will continue to benefit from skilled PT services to address functional mobility deficits, address ROM deficits, address strength deficits, analyze and address soft tissue restrictions, analyze and cue movement patterns, analyze and modify body mechanics/ergonomics and assess and modify postural abnormalities to attain remaining goals. []  See Plan of Care 
[]  See progress note/recertification 
[]  See Discharge Summary Progress towards goals / Updated goals: 
Short Term Goals: To be accomplished in 2 weeks: 1. Patient will be independent and compliant with HEP to achieve other goals. Status at eval: issued and reviewed initial HEP Current: met 2. Increase right shoulder PROM flex >/= 130 to restore normal joint mobility for ADL's. Status at eval: 90 
Current:120 
3.  Increase right shoulder PROM ER >/= 30 to restore normal joint mobility for ADL's. Status at eval: 0 (@25) Current:  0 at 25 
4. Increase right shoulder PROM IR >/= 80 to restore normal joint mobility for ADL's. Status at eval: 70 (@25) Current: 70 at [address] be accomplished in 4 weeks: 1. Improve FOTO score by 15 points to indicate decreased pain with ADL's. Status at eval: assess on 2nd visit, FOTO system down at time of eval 
Current; 6 point increase since start of care. 2. Increase right shoulder PROM flex >/= 160 to restore normal joint mobility for ADL's. Status at eval: 90 
3. Increase right shoulder PROM ER >/= 60 to restore normal joint mobility for ADL's. Status at eval: 0 (@25) 4. Progress with active and strengthening phase per MD protocol. Status at eval: PROM right shoulder only Current; Progressing PLAN 
[]  Upgrade activities as tolerated     [x]  Continue plan of care 
[]  Update interventions per flow sheet      
[]  Discharge due to:_ 
[]  Other:_ Rocio Downing PTA 7/27/2018  9:42 AM 
 
Future Appointments Date Time Provider Maxx Daniels 7/27/2018 10:45 AM LUCY [facility]  
8/1/2018 9:15 AM Rocio Downing PTA [facility]  
8/3/2018 9:30 AM Rocio Downing PTA [facility]

## 2018-08-01 ENCOUNTER — HOSPITAL ENCOUNTER (OUTPATIENT)
Dept: PHYSICAL THERAPY | Age: 73
Discharge: HOME | End: 2018-08-01
Payer: MEDICARE

## 2018-08-01 PROCEDURE — 97112 NEUROMUSCULAR REEDUCATION: CPT

## 2018-08-01 PROCEDURE — 97016 VASOPNEUMATIC DEVICE THERAPY: CPT

## 2018-08-01 PROCEDURE — 97140 MANUAL THERAPY 1/> REGIONS: CPT

## 2018-08-01 NOTE — PROGRESS NOTES
PT DAILY TREATMENT NOTE - [facility]  Patient Name: Delia Dumont Date:2018 : 1945 [x]  Patient  Verified Payor: [place] MEDICARE / Plan: Todd Healyy / Product Type: Medicare / In time:915  Out time:955 Total Treatment Time (min): 40 Total Timed Codes (min): 30 
1:1 Treatment Time ( only): 30 Visit #: 9 of 12 Treatment Area: Right shoulder pain [M25.511] SUBJECTIVE Pain Level (0-10 scale): 0-2/10 Any medication changes, allergies to medications, adverse drug reactions, diagnosis change, or new procedure performed?: [x] No    [] Yes (see summary sheet for update) Subjective functional status/changes:   [] No changes reported Pt reports that she has been feeling better but having some dizziness. OBJECTIVE Modality rationale: decrease inflammation and decrease pain to improve the patients ability to complete ADls Min Type Additional Details  
 [] Estim:  []Unatt       []IFC  []Premod []Other:  []w/ice   []w/heat Position: Location:  
 [] Estim: []Att    []TENS instruct  []NMES []Other:  []w/US   []w/ice   []w/heat Position: Location:  
 []  Traction: [] Cervical       []Lumbar 
                     [] Prone          []Supine []Intermittent   []Continuous Lbs: 
[] before manual 
[] after manual  
 []  Ultrasound: []Continuous   [] Pulsed []1MHz   []3MHz W/cm2: 
Location:  
 []  Iontophoresis with dexamethasone Location: [] Take home patch  
[] In clinic  
 []  Ice     []  heat 
[]  Ice massage 
[]  Laser  
[]  Anodyne Position: Location:  
 []  Laser with stim 
[]  Other:  Position: Location:  
10 [x]  Vasopneumatic Device Pressure:       [] lo [x] med [] hi  
Temperature: [] lo [x] med [] hi  
[] Skin assessment post-treatment:  []intact []redness- no adverse reaction 
  []redness  adverse reaction:  
 
 
20 min Neuromuscular Re-education:  []  See flow sheet : Rationale: increase ROM, increase strength, improve coordination and increase proprioception  to improve the patients ability to complete ADls 10 min Manual Therapy:  Myofascial arm pull to right, PROM Rationale: decrease pain, increase ROM, increase tissue extensibility, decrease edema  and decrease trigger points to complete ADls With 
 [] TE 
 [] TA 
 [] neuro 
 [] other: Patient Education: [x] Review HEP [] Progressed/Changed HEP based on:  
[] positioning   [] body mechanics   [] transfers   [] heat/ice application   
[] other:   
 
Other Objective/Functional Measures:  
 
Pain Level (0-10 scale) post treatment: 0/10 ASSESSMENT/Changes in Function: Pt demonstrates continued restrictions through shoulder in all planes of motion due to poor scapular mobility. Patient will continue to benefit from skilled PT services to address functional mobility deficits, address ROM deficits, address strength deficits, analyze and address soft tissue restrictions, analyze and cue movement patterns, analyze and modify body mechanics/ergonomics and assess and modify postural abnormalities to attain remaining goals. []  See Plan of Care 
[]  See progress note/recertification 
[]  See Discharge Summary Progress towards goals / Updated goals: 
Short Term Goals: To be accomplished in 2 weeks: 1. Patient will be independent and compliant with HEP to achieve other goals. Status at eval: issued and reviewed initial HEP Current: met 2. Increase right shoulder PROM flex >/= 130 to restore normal joint mobility for ADL's. Status at eval: 90 
Current:120 
3. Increase right shoulder PROM ER >/= 30 to restore normal joint mobility for ADL's. Status at eval: 0 (@25) Current:  0 at 25 
4. Increase right shoulder PROM IR >/= 80 to restore normal joint mobility for ADL's. Status at eval: 70 (@25) Current: 70 at [address] be accomplished in 4 weeks: 1.  Improve FOTO score by 15 points to indicate decreased pain with ADL's. Status at eval: assess on 2nd visit, FOTO system down at time of eval 
Current; 6 point increase since start of care. 2. Increase right shoulder PROM flex >/= 160 to restore normal joint mobility for ADL's. Status at eval: 90 
3. Increase right shoulder PROM ER >/= 60 to restore normal joint mobility for ADL's. Status at eval: 0 (@25) 4. Progress with active and strengthening phase per MD protocol. Status at eval: PROM right shoulder only Current; Progressing 
  
 
PLAN 
[]  Upgrade activities as tolerated     []  Continue plan of care 
[]  Update interventions per flow sheet      
[]  Discharge due to:_ 
[]  Other:_ Joan Spatz, PTA 8/1/2018  9:25 AM 
 
Future Appointments Date Time Provider Maxx Daniels 8/3/2018 9:30 AM Joan Spatz, PTA [facility]

## 2018-08-03 ENCOUNTER — HOSPITAL ENCOUNTER (OUTPATIENT)
Dept: PHYSICAL THERAPY | Age: 73
Discharge: HOME | End: 2018-08-03
Payer: MEDICARE

## 2018-08-03 PROCEDURE — 97112 NEUROMUSCULAR REEDUCATION: CPT

## 2018-08-03 PROCEDURE — 97016 VASOPNEUMATIC DEVICE THERAPY: CPT

## 2018-08-03 PROCEDURE — 97140 MANUAL THERAPY 1/> REGIONS: CPT

## 2018-08-03 NOTE — PROGRESS NOTES
PT DAILY TREATMENT NOTE - [facility]  Patient Name: Sintia Suarez Date:8/3/2018 : 1945 [x]  Patient  Verified Payor: [place] MEDICARE / Plan: Todd Olmstead y / Product Type: Medicare / In time:935  Out time:1028 Total Treatment Time (min): 53 Total Timed Codes (min): 43 
1:1 Treatment Time ([facility] only): 43 Visit #: 10 of 12 Treatment Area: Right shoulder pain [M25.511] SUBJECTIVE Pain Level (0-10 scale): 1-2/10 Any medication changes, allergies to medications, adverse drug reactions, diagnosis change, or new procedure performed?: [x] No    [] Yes (see summary sheet for update) Subjective functional status/changes:   [] No changes reported Pt reports that she is feeling a bit better but her neck is killing me. OBJECTIVE Modality rationale: decrease inflammation and decrease pain to improve the patients ability to complete ADls Min Type Additional Details  
 [] Estim:  []Unatt       []IFC  []Premod []Other:  []w/ice   []w/heat Position: Location:  
 [] Estim: []Att    []TENS instruct  []NMES []Other:  []w/US   []w/ice   []w/heat Position: Location:  
 []  Traction: [] Cervical       []Lumbar 
                     [] Prone          []Supine []Intermittent   []Continuous Lbs: 
[] before manual 
[] after manual  
 []  Ultrasound: []Continuous   [] Pulsed []1MHz   []3MHz W/cm2: 
Location:  
 []  Iontophoresis with dexamethasone Location: [] Take home patch  
[] In clinic  
 []  Ice     []  heat 
[]  Ice massage 
[]  Laser  
[]  Anodyne Position: Location:  
 []  Laser with stim 
[]  Other:  Position: Location:  
10 [x]  Vasopneumatic Device Pressure:       [] lo [x] med [] hi  
Temperature: [] lo [x] med [] hi  
[x] Skin assessment post-treatment:  [x]intact [x]redness- no adverse reaction 
  []redness  adverse reaction:  
  
33 min Neuromuscular Re-education:  []  See flow sheet :facilitate scapular stablity Rationale: increase ROM, increase strength, improve coordination and increase proprioception  to improve the patients ability to complete ADls 10 min Manual Therapy:  SOR, myofascial arm pull on right, scapular mobes, PROM of right shoulder Rationale: decrease pain, increase ROM, increase tissue extensibility, decrease edema  and decrease trigger points to complete ADls With 
 [] TE 
 [] TA 
 [] neuro 
 [] other: Patient Education: [x] Review HEP [] Progressed/Changed HEP based on:  
[] positioning   [] body mechanics   [] transfers   [] heat/ice application   
[] other:   
 
Other Objective/Functional Measures:    
 
Pain Level (0-10 scale) post treatment: 0/10 ASSESSMENT/Changes in Function:Pt demonstrates poor scapular mobility on right side contributing to poor shoulder mobility. Pt is progressing with mobility however. Patient will continue to benefit from skilled PT services to address functional mobility deficits, address ROM deficits, address strength deficits, analyze and address soft tissue restrictions, analyze and cue movement patterns, analyze and modify body mechanics/ergonomics and assess and modify postural abnormalities to attain remaining goals. []  See Plan of Care 
[]  See progress note/recertification 
[]  See Discharge Summary Progress towards goals / Updated goals: 
Short Term Goals: To be accomplished in 2 weeks: 1. Patient will be independent and compliant with HEP to achieve other goals. Status at eval: issued and reviewed initial HEP Current: met 2. Increase right shoulder PROM flex >/= 130 to restore normal joint mobility for ADL's. Status at eval: 90 
Current:NEARLY MET 3. Increase right shoulder PROM ER >/= 30 to restore normal joint mobility for ADL's. Status at eval: 0 (@25) Current:  0 at 25 
4. Increase right shoulder PROM IR >/= 80 to restore normal joint mobility for ADL's.  
Status at eval: 70 (@25) Current: 70 at [address] be accomplished in 4 weeks: 1. Improve FOTO score by 15 points to indicate decreased pain with ADL's. Status at eval: assess on 2nd visit, FOTO system down at time of eval 
Current; MET: 16 point score increase 2. Increase right shoulder PROM flex >/= 160 to restore normal joint mobility for ADL's. Status at eval: 90 
3. Increase right shoulder PROM ER >/= 60 to restore normal joint mobility for ADL's. Status at eval: 0 (@25) 4. Progress with active and strengthening phase per MD protocol. Status at eval: PROM right shoulder only Current; Progressing 
  
 
PLAN 
[]  Upgrade activities as tolerated     [x]  Continue plan of care 
[]  Update interventions per flow sheet      
[]  Discharge due to:_ 
[]  Other:_ Abby Nolasco PTA 8/3/2018  9:52 AM 
 
Future Appointments Date Time Provider Maxx Daniels 8/7/2018 1:00 PM [facility] JAYLEN DOAN [facility]  
8/10/2018 11:00 AM LUCY Grimes [facility]

## 2018-08-07 ENCOUNTER — HOSPITAL ENCOUNTER (OUTPATIENT)
Dept: PHYSICAL THERAPY | Age: 73
Discharge: HOME | End: 2018-08-07
Payer: MEDICARE

## 2018-08-07 PROCEDURE — G8984 CARRY CURRENT STATUS: HCPCS

## 2018-08-07 PROCEDURE — 97110 THERAPEUTIC EXERCISES: CPT

## 2018-08-07 PROCEDURE — G8985 CARRY GOAL STATUS: HCPCS

## 2018-08-07 NOTE — PROGRESS NOTES
[facility] at [address] [place] [address]  Phone: [phone]   Fax: [phone]    Continued Plan of Care/ Re-certification for Physical Therapy Services    Patient name: Timur Zaldivar Start of Care: 7/10/18   Referral source: Alisha Aranda MD : 1945   Medical/Treatment Diagnosis: Right shoulder pain [M25.511] Onset Date:18     Prior Hospitalization: see medical history Provider#: [id number]   Medications: Verified on Patient Summary List    Comorbidities: DM, arthritis, HTN  Prior Level of Function:chronic right shoulder pain with ADL's    Visits from Start of Care: 11    Missed Visits: 0    The Plan of Care and following information is based on the patient's current status:  Short Term Goals: To be accomplished in 2 weeks:  1. Patient will be independent and compliant with HEP to achieve other goals. Status at eval: issued and reviewed initial HEP  Current: met  2. Increase right shoulder PROM flex >/= 130 to restore normal joint mobility for ADL's. Status at eval: 90  Current: MET. 140  3. Increase right shoulder PROM ER >/= 30 to restore normal joint mobility for ADL's. Status at eval: 0 (@25)   Current:  MET. 35 at 25  4. Increase right shoulder PROM IR >/= 80 to restore normal joint mobility for ADL's. Status at eval: 70 (@25)  Current: MET. 85 at [address] be accomplished in 4 weeks:  1. Improve FOTO score by 15 points to indicate decreased pain with ADL's. Status at eval: assess on 2nd visit, FOTO system down at time of eval  Current; MET: 16 point score increase    Key functional changes: \"everything's going better\". Pt reports reduced pain; average pain 1/10 except for at night pain up to 3/10.        Problems/ barriers to goal attainment: none noted     Problem List: pain affecting function, decrease ROM, decrease strength, decrease ADL/ functional abilitiies, decrease activity tolerance and decrease flexibility/ joint mobility    Treatment Plan: Therapeutic exercise, Therapeutic activities, Neuromuscular re-education, Physical agent/modality, Manual therapy and Patient education     Patient Goal (s) has been updated and includes: \"regain functional use of arm\"     Goals for this certification period to be accomplished in 4-6 weeks:  1. Increase right shoulder PROM flex >/= 160 to restore normal joint mobility for ADL's. Status at eval: 90  2. Increase right shoulder PROM ER >/= 60 to restore normal joint mobility for ADL's. Status at eval: 0 (@25)  3. Progress with active and strengthening phase per MD protocol. Status at eval: PROM right shoulder only    Frequency / Duration: Patient to be seen 2 times per week for 4 weeks:    Assessment / Recommendations:Pt will benefit from skilled progression of PT in order to promote goal attainment. G-Codes (GP)  Carry   Current  CK= 40-59%   [id number] Goal  CJ= 20-39%    The severity rating is based on clinical judgment and the FOTO score. Certification Period: 08/07/18 to 11/05/2018    Florecnia Patel, PT 8/7/2018 1:11 PM    ________________________________________________________________________  I certify that the above Therapy Services are being furnished while the patient is under my care. I agree with the treatment plan and certify that this therapy is necessary. [] I have read the above and request that my patient continue as recommended.   [] I have read the above report and request that my patient continue therapy with the following changes/special instructions: ______________________________________  [] I have read the above report and request that my patient be discharged from therapy    Physician's Signature:____________Date:_________TIME:________    ** Signature, Date and Time must be completed for valid certification **    Please sign and return to   [facility] at [facility]  [address] [facility], [address] [place] [address]     Phone: [phone] Fax: [phone]

## 2018-08-07 NOTE — PROGRESS NOTES
PT DAILY TREATMENT NOTE - [facility]     Patient Name: Timur Zaldivar  Date:2018  : 1945  [x]  Patient  Verified  Payor: [place] MEDICARE / Plan: VA MEDICARE PART A & B / Product Type: Medicare /    In time: 1:04  Out time:1:52  Total Treatment Time (min): 48  Total Timed Codes (min): 38  1:1 Treatment Time ( only): 38   Visit #:     Treatment Area: Right shoulder pain [M25.511]    SUBJECTIVE  Pain Level (0-10 scale): 0  Any medication changes, allergies to medications, adverse drug reactions, diagnosis change, or new procedure performed?: [x] No    [] Yes (see summary sheet for update)  Subjective functional status/changes:   [] No changes reported  See PN    OBJECTIVE    Modality rationale: decrease inflammation and decrease pain to improve the patients ability to complete ADls   Min Type Additional Details    [] Estim:  []Unatt       []IFC  []Premod                        []Other:  []w/ice   []w/heat  Position:  Location:    [] Estim: []Att    []TENS instruct  []NMES                    []Other:  []w/US   []w/ice   []w/heat  Position:  Location:    []  Traction: [] Cervical       []Lumbar                       [] Prone          []Supine                       []Intermittent   []Continuous Lbs:  [] before manual  [] after manual    []  Ultrasound: []Continuous   [] Pulsed                           []1MHz   []3MHz W/cm2:  Location:    []  Iontophoresis with dexamethasone         Location: [] Take home patch   [] In clinic    []  Ice     []  heat  []  Ice massage  []  Laser   []  Anodyne Position:  Location:    []  Laser with stim  []  Other:  Position:  Location:   10 [x]  Vasopneumatic Device Pressure:       [] lo [x] med [] hi   Temperature: [] lo [] med [] hi   [x] Skin assessment post-treatment:  [x]intact []redness- no adverse reaction    []redness  adverse reaction:     28 min Therapeutic Exercise:  [x] See flow sheet :   Rationale: increase ROM, increase strength, improve coordination and increase proprioception  to improve the patients ability to complete ADls      10 min Manual Therapy:  STM pecs, UT, MT, rhomboids. PROM R shoulder all planes. Scap-thoracic mobs. Rationale: decrease pain, increase ROM, increase tissue extensibility, decrease edema  and decrease trigger points to complete ADls            With   [] TE   [] TA   [] neuro   [] other: Patient Education: [x] Review HEP    [] Progressed/Changed HEP based on:   [] positioning   [] body mechanics   [] transfers   [] heat/ice application    [] other:      Other Objective/Functional Measures: See PN     Pain Level (0-10 scale) post treatment: 0    ASSESSMENT/Changes in Function:     Patient will continue to benefit from skilled PT services to modify and progress therapeutic interventions, address ROM deficits, address strength deficits, analyze and address soft tissue restrictions and analyze and cue movement patterns to attain remaining goals. []  See Plan of Care  []  See progress note/recertification  []  See Discharge Summary         Progress towards goals / Updated goals:  See PN    PLAN  [x]  Upgrade activities as tolerated     [x]  Continue plan of care  []  Update interventions per flow sheet       []  Discharge due to:_  []  Other:_      Charlette Patel, PT 8/7/2018  1:53 PM    Future Appointments  Date Time Provider Maxx Daniels   8/10/2018 11:00 AM Alban Hart [place] [facility]

## 2018-08-08 ENCOUNTER — APPOINTMENT (OUTPATIENT)
Dept: PHYSICAL THERAPY | Age: 73
End: 2018-08-08
Payer: MEDICARE

## 2018-08-10 ENCOUNTER — HOSPITAL ENCOUNTER (OUTPATIENT)
Dept: PHYSICAL THERAPY | Age: 73
Discharge: HOME | End: 2018-08-10
Payer: MEDICARE

## 2018-08-10 PROCEDURE — 97140 MANUAL THERAPY 1/> REGIONS: CPT

## 2018-08-10 PROCEDURE — 97112 NEUROMUSCULAR REEDUCATION: CPT

## 2018-08-10 PROCEDURE — 97016 VASOPNEUMATIC DEVICE THERAPY: CPT

## 2018-08-10 NOTE — PROGRESS NOTES
PT DAILY TREATMENT NOTE - [facility]     Patient Name: Timur Zaldivar  Date:8/10/2018  : 1945  [x]  Patient  Verified  Payor: Carlos Manuel Dear / Plan: [place] MED[place] & B / Product Type: Medicare /    In time:1104  Out time:1145  Total Treatment Time (min): 41  Total Timed Codes (min): 31  1:1 Treatment Time ( only): 31   Visit #: 12     Treatment Area: Right shoulder pain [M25.511]    SUBJECTIVE  Pain Level (0-10 scale): 0/10  Any medication changes, allergies to medications, adverse drug reactions, diagnosis change, or new procedure performed?: [x] No    [] Yes (see summary sheet for update)  Subjective functional status/changes:   [x] No changes reported       OBJECTIVE    Modality rationale: decrease inflammation and decrease pain to improve the patients ability to complete ADls   Min Type Additional Details    [] Estim:  []Unatt       []IFC  []Premod                        []Other:  []w/ice   []w/heat  Position:  Location:    [] Estim: []Att    []TENS instruct  []NMES                    []Other:  []w/US   []w/ice   []w/heat  Position:  Location:    []  Traction: [] Cervical       []Lumbar                       [] Prone          []Supine                       []Intermittent   []Continuous Lbs:  [] before manual  [] after manual    []  Ultrasound: []Continuous   [] Pulsed                           []1MHz   []3MHz W/cm2:  Location:    []  Iontophoresis with dexamethasone         Location: [] Take home patch   [] In clinic    []  Ice     []  heat  []  Ice massage  []  Laser   []  Anodyne Position:  Location:    []  Laser with stim  []  Other:  Position:  Location:   10 [x]  Vasopneumatic Device Pressure:       [] lo [x] med [] hi   Temperature: [] lo [x] med [] hi   [x] Skin assessment post-treatment:  [x]intact [x]redness- no adverse reaction    redness     21 min Neuromuscular Re-education:  []  See flow sheet :   Rationale: increase ROM, increase strength, improve coordination and increase proprioception  to improve the patients ability to complete ADls    10 min Manual Therapy:  MFR to right shoulder, myofascial arm pull, PROM   Rationale: decrease pain, increase ROM, increase tissue extensibility, decrease edema  and decrease trigger points to complete ADls          With   [] TE   [] TA   [x] neuro   [] other: Patient Education: [x] Review HEP    [] Progressed/Changed HEP based on:   [x] positioning   [x] body mechanics   [] transfers   [] heat/ice application    [] other:      Other Objective/Functional Measures:      Pain Level (0-10 scale) post treatment: 0/10    ASSESSMENT/Changes in Function: Pt demonstrates continued myofascial restrictions limiting flexion. With myofascial work her PROM improved. Patient will continue to benefit from skilled PT services to address functional mobility deficits, address ROM deficits, address strength deficits, analyze and address soft tissue restrictions, analyze and cue movement patterns and analyze and modify body mechanics/ergonomics to attain remaining goals. []  See Plan of Care  []  See progress note/recertification  []  See Discharge Summary         Progress towards goals / Updated goals:     Goals for this certification period to be accomplished in 4-6 weeks:  1. Increase right shoulder PROM flex >/= 160 to restore normal joint mobility for ADL's. Status at eval: 90  Current: Improving  2. Increase right shoulder PROM ER >/= 60 to restore normal joint mobility for ADL's. Status at eval: 0 (@25)  3. Progress with active and strengthening phase per MD protocol.   Status at eval: PROM right shoulder only       PLAN  []  Upgrade activities as tolerated     [x]  Continue plan of care  []  Update interventions per flow sheet       []  Discharge due to:_  []  Other:_      Alexis Pearce PTA 8/10/2018  8:07 AM    Future Appointments  Date Time Provider Maxx Daniels   8/10/2018 11:00 AM Alexis Pearce PTA [facility]

## 2018-08-14 ENCOUNTER — APPOINTMENT (OUTPATIENT)
Dept: PHYSICAL THERAPY | Age: 73
End: 2018-08-14
Payer: MEDICARE

## 2018-08-16 ENCOUNTER — HOSPITAL ENCOUNTER (OUTPATIENT)
Dept: PHYSICAL THERAPY | Age: 73
Discharge: HOME | End: 2018-08-16
Payer: MEDICARE

## 2018-08-16 PROCEDURE — 97016 VASOPNEUMATIC DEVICE THERAPY: CPT

## 2018-08-16 PROCEDURE — 97110 THERAPEUTIC EXERCISES: CPT

## 2018-08-16 PROCEDURE — 97140 MANUAL THERAPY 1/> REGIONS: CPT

## 2018-08-16 NOTE — PROGRESS NOTES
PT DAILY TREATMENT NOTE - [facility]     Patient Name: Saturnino Santiago  Date:2018  : 1945  [x]  Patient  Verified  Payor: [place] MEDICARE / Plan: [place] MEDICARE PART A & B / Product Type: Medicare /    In time: 12:29  Out time:1:09  Total Treatment Time (min): 40  Total Timed Codes (min): 30  1:1 Treatment Time ( [address] only): 30   Visit #: 13     Treatment Area: Right shoulder pain [M25.511]    SUBJECTIVE  Pain Level (0-10 scale): 0/10  Any medication changes, allergies to medications, adverse drug reactions, diagnosis change, or new procedure performed?: [x] No    [] Yes (see summary sheet for update)  Subjective functional status/changes:   [] No changes reported  Patient stated that her arm is a little sore at the front, especially at night time. OBJECTIVE    Modality rationale: decrease edema, decrease inflammation and decrease pain to improve the patients ability to perform ADls.     Min Type Additional Details    [] Estim:  []Unatt       []IFC  []Premod                        []Other:  []w/ice   []w/heat  Position:  Location:    [] Estim: []Att    []TENS instruct  []NMES                    []Other:  []w/US   []w/ice   []w/heat  Position:  Location:    []  Traction: [] Cervical       []Lumbar                       [] Prone          []Supine                       []Intermittent   []Continuous Lbs:  [] before manual  [] after manual    []  Ultrasound: []Continuous   [] Pulsed                           []1MHz   []3MHz W/cm2:  Location:    []  Iontophoresis with dexamethasone         Location: [] Take home patch   [] In clinic    []  Ice     []  heat  []  Ice massage  []  Laser   []  Anodyne Position:  Location:    []  Laser with stim  []  Other:  Position:  Location:   10 [x]  Vasopneumatic Device Pressure:       [] lo [x] med [] hi   Temperature: [] lo [x] med [] hi   [] Skin assessment post-treatment:  []intact []redness- no adverse reaction    []redness  adverse reaction:     20 min Therapeutic Exercise:  [x] See flow sheet :   Rationale: increase ROM to improve the patients ability to perform ADls. 10 min Manual Therapy:  Gentle scap mobs, PROM into flex, scap, ER (at 30deg) within a pain free range. Rationale: decrease pain, increase ROM and increase tissue extensibility to perform ADLs. With   [] TE   [] TA   [] neuro   [] other: Patient Education: [x] Review HEP    [] Progressed/Changed HEP based on:   [] positioning   [] body mechanics   [] transfers   [] heat/ice application    [] other:      Other Objective/Functional Measures: ROM steadily improved with pulleys into scaption. Pain Level (0-10 scale) post treatment: 0/10     ASSESSMENT/Changes in Function:  Mild tightness noted at end range of PROM into flex, scap, and ER (at 30 deg). Patient reported feeling tightness, but no increase in pain with manual.     Patient will continue to benefit from skilled PT services to modify and progress therapeutic interventions, address functional mobility deficits, address ROM deficits, address strength deficits, analyze and address soft tissue restrictions, analyze and cue movement patterns and assess and modify postural abnormalities to attain remaining goals. []  See Plan of Care  []  See progress note/recertification  []  See Discharge Summary         Progress towards goals / Updated goals:  Goals for this certification period to be accomplished in 4-6 weeks:  1. Increase right shoulder PROM flex >/= 160 to restore normal joint mobility for ADL's. Status at eval: 90  Current: Improving  2. Increase right shoulder PROM ER >/= 60 to restore normal joint mobility for ADL's. Status at eval: 0 (@25)  3. Progress with active and strengthening phase per MD protocol.   Status at eval: PROM right shoulder only    PLAN  []  Upgrade activities as tolerated     [x]  Continue plan of care  []  Update interventions per flow sheet       []  Discharge due to:_  []  Other:_      Purvi Rahman PT 8/16/2018  12:25 PM    Future Appointments  Date Time Provider Maxx Daniels   8/16/2018 12:30 PM Nadege Moran PT [facility]

## 2018-08-20 ENCOUNTER — HOSPITAL ENCOUNTER (OUTPATIENT)
Dept: PHYSICAL THERAPY | Age: 73
Discharge: HOME | End: 2018-08-20
Payer: MEDICARE

## 2018-08-20 PROCEDURE — 97110 THERAPEUTIC EXERCISES: CPT | Performed by: PHYSICAL THERAPIST

## 2018-08-20 PROCEDURE — 97140 MANUAL THERAPY 1/> REGIONS: CPT | Performed by: PHYSICAL THERAPIST

## 2018-08-20 NOTE — PROGRESS NOTES
PT DAILY TREATMENT NOTE - [facility]     Patient Name: Delia Dumont  Date:2018  : 1945  [x]  Patient  Verified  Payor: Tan Guicho / Plan: [place] MEDICARE PART A & B / Product Type: Medicare /    In time:0830  Out DPYA:[id number]  Total Treatment Time (min): 62  Total Timed Codes (min): 62  1:1 Treatment Time ( [address] only): 39   Visit #: 14     Treatment Area: Right shoulder pain [M25.511]    SUBJECTIVE  Pain Level (0-10 scale): 0  Any medication changes, allergies to medications, adverse drug reactions, diagnosis change, or new procedure performed?: [x] No    [] Yes (see summary sheet for update)  Subjective functional status/changes:   [x] No changes reported    OBJECTIVE  Modality rationale: decrease edema, decrease inflammation and decrease pain to improve the patients ability to perform ADls.     Min Type Additional Details     [] Estim:  []Unatt       []IFC  []Premod                        []Other:  []w/ice   []w/heat  Position:  Location:     [] Estim: []Att    []TENS instruct  []NMES                    []Other:  []w/US   []w/ice   []w/heat  Position:  Location:     []  Traction: [] Cervical       []Lumbar                       [] Prone          []Supine                       []Intermittent   []Continuous Lbs:  [] before manual  [] after manual     []  Ultrasound: []Continuous   [] Pulsed                           []1MHz   []3MHz W/cm2:  Location:     []  Iontophoresis with dexamethasone         Location: [] Take home patch   [] In clinic     []  Ice     []  heat  []  Ice massage  []  Laser   []  Anodyne Position:  Location:     []  Laser with stim  []  Other:  Position:  Location:   10 [x]  Vasopneumatic Device Pressure:       [] lo [x] med [] hi   Temperature: [] lo [x] med [] hi   [] Skin assessment post-treatment:  []intact []redness- no adverse reaction    []redness  adverse reaction:      42 min Therapeutic Exercise:  [x] See flow sheet :   Rationale: increase ROM to improve the patients ability to perform ADls.    10 min Manual Therapy: Thoracic mobs PA T4 -T2 , manipulation to T4 with cavitation, PROM into flex, scap, ER (at 30deg) within a pain free range. Rationale: decrease pain, increase ROM and increase tissue extensibility to perform ADLs          With   [] TE   [] TA   [] neuro   [] other: Patient Education: [x] Review HEP    [] Progressed/Changed HEP based on:   [] positioning   [] body mechanics   [] transfers   [] heat/ice application    [] other:      Other Objective/Functional Measures: noted C6-T4 left rotation alginment but = following manual tech   ROM : initial FE  Right : 92/112 A/PROM , 151 deg left after repeated mobility ex right 96 deg /120 , following manual tech 110/130 deg   ABD right 75 deg , left 166 deg , 86/91 A/PROM following repeated mobility ex , 90/100 following manual tech      Pain Level (0-10 scale) post treatment: 0    ASSESSMENT/Changes in Function: pt reports tingle down left arm to hand with left diagonal stretch for right levator scap . No tingling with direct SB left , pt responded well to manual tech for improved alignment and for ROM as well as repeated mobility ex     Patient will continue to benefit from skilled PT services to modify and progress therapeutic interventions, address functional mobility deficits, address ROM deficits, address strength deficits, analyze and address soft tissue restrictions, analyze and cue movement patterns, analyze and modify body mechanics/ergonomics and assess and modify postural abnormalities to attain remaining goals. [x]  See Plan of Care  []  See progress note/recertification  []  See Discharge Summary         Progress towards goals / Updated goals:  Goals for this certification period to be accomplished in 4-6 weeks:  1. Increase right shoulder PROM flex >/= 160 to restore normal joint mobility for ADL's. Status at eval: 90  Current: Improving up to 130 deg at end of session   2.  Increase right shoulder PROM ER >/= 60 to restore normal joint mobility for ADL's. Status at eval: 0 (@25)  3. Progress with active and strengthening phase per MD protocol.   Status at eval: PROM right shoulder only  Current : progressing tolerated shrugs and elbow 3# today , rows with OTB     PLAN  [x]  Upgrade activities as tolerated     [x]  Continue plan of care  []  Update interventions per flow sheet       []  Discharge due to:_  []  Other:_      Renay Ignacio PT 8/20/2018  12:14 PM    Future Appointments  Date Time Provider Maxx Daniels   8/23/2018 8:30 AM [facility]   8/27/2018 8:30 AM Renay Ignacio PT [facility]   8/30/2018 8:30 AM Renay Ignacio PT [facility]   9/5/2018 8:30 AM Renay Ignacio PT [facility]   9/7/2018 8:30 AM Renay Ignacio PT [facility]   9/11/2018 8:30 AM Demario Haines PT [facility]   9/14/2018 8:30 AM Demario Haines PT [facility]

## 2018-08-23 ENCOUNTER — HOSPITAL ENCOUNTER (OUTPATIENT)
Dept: PHYSICAL THERAPY | Age: 73
Discharge: HOME | End: 2018-08-23
Payer: MEDICARE

## 2018-08-23 PROCEDURE — 97140 MANUAL THERAPY 1/> REGIONS: CPT | Performed by: PHYSICAL THERAPIST

## 2018-08-23 PROCEDURE — 97110 THERAPEUTIC EXERCISES: CPT | Performed by: PHYSICAL THERAPIST

## 2018-08-23 PROCEDURE — 97016 VASOPNEUMATIC DEVICE THERAPY: CPT | Performed by: PHYSICAL THERAPIST

## 2018-08-23 NOTE — PROGRESS NOTES
PT DAILY TREATMENT NOTE - [facility]     Patient Name: Osvaldo Ravi  Date:2018  : 1945  [x]  Patient  Verified  Payor: Zac Perish / Plan: [place] MEDICARE PART A & B / Product Type: Medicare /    In IHUV:  Out time:927  Total Treatment Time (min): 60  Total Timed Codes (min): 50  1:1 Treatment Time ( only): 40   Visit #: 15     Treatment Area: Right shoulder pain [M25.511]    SUBJECTIVE  Pain Level (0-10 scale): 0  Any medication changes, allergies to medications, adverse drug reactions, diagnosis change, or new procedure performed?: [x] No    [] Yes (see summary sheet for update)  Subjective functional status/changes:   [] No changes reported  Nagging tooth ache type pain in shoulder at night before bed last night , 3/10 had to take 2 pain   No more  c/o pain radiating up to face or into neck as had noted previously     OBJECTIVE        Modality rationale: decrease edema, decrease inflammation and decrease pain to improve the patients ability to perform ADls.     Min Type Additional Details      [] Estim:  []Unatt       []IFC  []Premod                        []Other:  []w/ice   []w/heat  Position:  Location:      [] Estim: []Att    []TENS instruct  []NMES                    []Other:  []w/US   []w/ice   []w/heat  Position:  Location:      []  Traction: [] Cervical       []Lumbar                       [] Prone          []Supine                       []Intermittent   []Continuous Lbs:  [] before manual  [] after manual      []  Ultrasound: []Continuous   [] Pulsed                           []1MHz   []3MHz W/cm2:  Location:      []  Iontophoresis with dexamethasone         Location: [] Take home patch   [] In clinic      []  Ice     []  heat  []  Ice massage  []  Laser   []  Anodyne Position:  Location:      []  Laser with stim  []  Other:  Position:  Location:   10 [x]  Vasopneumatic Device Pressure:       [] lo [x] med [] hi   Temperature: [] lo [x] med [] hi   [] Skin assessment post-treatment:  []intact []redness- no adverse reaction    []redness  adverse reaction:       40 min Therapeutic Exercise:  [x] See flow sheet :   Rationale: increase ROM to improve the patients ability to perform ADls.     10 min Manual Therapy: Thoracic mobs PA T4 -T2 , MET for cervical thoracic junction alignment , PROM into flex, scap, ER (at 30deg) within a pain free range,    Rationale: decrease pain, increase ROM and increase tissue extensibility to perform ADLs          With   [] TE   [] TA   [] neuro   [] other: Patient Education: [x] Review HEP    [] Progressed/Changed HEP based on:   [] positioning   [] body mechanics   [] transfers   [] heat/ice application    [] other:      Other Objective/Functional Measures:     noted C6-T4 left rotation alginment but = following manual tech ( mobilisation and MET )also noted approx 10 deg improvement in shoulder FE and abd following manual     ROM : initial FE  Right : 91/115 A/PROM , following manual tech 116/125 deg   ABD right 75 deg , left 166 deg , 75/90 A/PROM following repeated mobility ex , 100/105 following manual tech                       Pain Level (0-10 scale) post treatment: 0    ASSESSMENT/Changes in Function: pt responds well to manual therapy for alignment and improve mobility and progressing with ex noting increase ROM following repeated mobility ex , tolerated 1# with ER in side lie     Patient will continue to benefit from skilled PT services to modify and progress therapeutic interventions, address functional mobility deficits, address ROM deficits, address strength deficits, analyze and address soft tissue restrictions, analyze and cue movement patterns, analyze and modify body mechanics/ergonomics and assess and modify postural abnormalities to attain remaining goals.      [x]  See Plan of Care  []  See progress note/recertification  []  See Discharge Summary         Progress towards goals / Updated goals:  Goals for this certification period to be accomplished in 4-6 weeks:  1. Increase right shoulder PROM flex >/= 160 to restore normal joint mobility for ADL's. Status at eval: 90  Current: Improving up to 125 deg at end of session   2. Increase right shoulder PROM ER >/= 60 to restore normal joint mobility for ADL's. Status at eval: 0 (@25)  Current : at side 47 deg AROM progressing   3. Progress with active and strengthening phase per MD protocol.   Status at eval: PROM right shoulder only  Current : progressing tolerated shrugs and elbow 3# today , rows with OTB     PLAN  [x]  Upgrade activities as tolerated     [x]  Continue plan of care  []  Update interventions per flow sheet       []  Discharge due to:_  []  Other:_      Nickie Hernández PT 8/23/2018  8:33 AM    Future Appointments  Date Time Provider Maxx Daniels   8/27/2018 8:30 AM JAYLEN Avalos [facility]   8/30/2018 9:30 AM Babak Donaldson PTA [facility]   9/5/2018 8:30 AM J[facility]LEN [facility]   9/7/2018 8:30 AM JAYLEN AvalosTCLAIRE [facility]   9/11/2018 8:30 AM JAYLEN Reis[facility]ARGARITA [facility]   9/14/2018 8:30 AM JAYLEN ReisHPTCLAIRE [facility]

## 2018-08-27 ENCOUNTER — HOSPITAL ENCOUNTER (OUTPATIENT)
Dept: PHYSICAL THERAPY | Age: 73
Discharge: HOME | End: 2018-08-27
Payer: MEDICARE

## 2018-08-27 PROCEDURE — 97110 THERAPEUTIC EXERCISES: CPT | Performed by: PHYSICAL THERAPIST

## 2018-08-27 PROCEDURE — 97140 MANUAL THERAPY 1/> REGIONS: CPT | Performed by: PHYSICAL THERAPIST

## 2018-08-27 NOTE — PROGRESS NOTES
PT DAILY TREATMENT NOTE - [facility]      Patient Name: Delia Dumont  Date:2018  : 1945  [x]  Patient  Verified  Payor: Tan Lindo / Plan: [place] MEDICARE PART A & B / Product Type: Medicare /    In time:830  Out time:09  Total Treatment Time (min): 70  Total Timed Codes (min): 60  1:1 Treatment Time ( only): 40   Visit #: 16      Treatment Area: Right shoulder pain [M25.511]     SUBJECTIVE  Pain Level (0-10 scale): 0  Any medication changes, allergies to medications, adverse drug reactions, diagnosis change, or new procedure performed?: [x] No    [] Yes (see summary sheet for update)  Subjective functional status/changes:   [] No changes reported  Nagging tooth ache type pain in shoulder at night before , pain 2/10 when lays down at night   Pt noticing increase ease with reaching into first shelf of kitchen cupboard   No more  c/o pain radiating up to face or into neck as had noted previously   Pt noting crawling sensation left shoulder to thumb is occurring more      OBJECTIVE            Modality rationale: decrease edema, decrease inflammation and decrease pain to improve the patients ability to perform ADls.     Min Type Additional Details      [] Estim:  []Unatt       []IFC  []Premod                        []Other:  []w/ice   []w/heat  Position:  Location:      [] Estim: []Att    []TENS instruct  []NMES                    []Other:  []w/US   []w/ice   []w/heat  Position:  Location:      []  Traction: [] Cervical       []Lumbar                       [] Prone          []Supine                       []Intermittent   []Continuous Lbs:  [] before manual  [] after manual      []  Ultrasound: []Continuous   [] Pulsed                           []1MHz   []3MHz W/cm2:  Location:      []  Iontophoresis with dexamethasone         Location: [] Take home patch   [] In clinic      []  Ice     []  heat  []  Ice massage  []  Laser   []  Anodyne Position:  Location:      []  Laser with stim  []  Other:  Position:  Location:   10 [x]  Vasopneumatic Device Pressure:       [] lo [x] med [] hi   Temperature: [] lo [x] med [] hi   [] Skin assessment post-treatment:  []intact []redness- no adverse reaction    []redness  adverse reaction:       50 min Therapeutic Exercise:  [x] See flow sheet :   Rationale: increase ROM to improve the patients ability to perform ADls.        10 min Manual Therapy: Thoracic mobs PA T4 -T2 , MET for cervical thoracic junction alignment , PROM into flex, scap, ER (at 30deg) within a pain free range,    Rationale: decrease pain, increase ROM and increase tissue extensibility to perform ADLs                                                                                                                   With   [] TE   [] TA   [] neuro   [] other: Patient Education: [x] Review HEP    [] Progressed/Changed HEP based on:   [] positioning   [] body mechanics   [] transfers   [] heat/ice application    [] other:       Other Objective/Functional Measures:                   noted C6-C7 left rotation alginment but = following manual tech ( mobilisation and MET )also noted approx 10 deg improvement in shoulder FE and abd following manual      ROM : initial FE  Right : 90 AROM , following repeated mobility 101 deg , following manual 110  deg   ABD right 71 deg 88 following manual tech                        Pain Level (0-10 scale) post treatment: 0     ASSESSMENT/Changes in Function: pt responds well to manual therapy for alignment and improve mobility and progressing with ex noting increase ROM following repeated mobility ex , tolerated 1# with ER in side lie      Patient will continue to benefit from skilled PT services to modify and progress therapeutic interventions, address functional mobility deficits, address ROM deficits, address strength deficits, analyze and address soft tissue restrictions, analyze and cue movement patterns, analyze and modify body mechanics/ergonomics and assess and modify postural abnormalities to attain remaining goals.      [x]  See Plan of Care  []  See progress note/recertification  []  See Discharge Summary      Progress towards goals / Updated goals:  Goals for this certification period to be accomplished in 4-6 weeks:  1. Increase right shoulder PROM flex >/= 160 to restore normal joint mobility for ADL's. Status at eval: 90  Current: Improving up to 125 deg at end of session last session   2. Increase right shoulder PROM ER >/= 60 to restore normal joint mobility for ADL's. Status at eval: 0 (@25)  Current : at side 57 deg AROM progressing   3. Progress with active and strengthening phase per MD protocol.   Status at eval: PROM right shoulder only  Current : progressing tolerated shrugs and elbow 3#  , rows with OTB        PLAN  [x]  Upgrade activities as tolerated     [x]  Continue plan of care  []  Update interventions per flow sheet       []  Discharge due to:_  []  Other:_      Roly Never, PT 8/27/2018  7:13 PM    Future Appointments  Date Time Provider Maxx Daniels   8/30/2018 9:30 AM Brayan Calixto, PTA [facility]   9/5/2018 8:30 AM Welfamena Never, PT [facility]   9/7/2018 8:30 AM Welford Never, PT [facility]   9/11/2018 8:30 AM General Amysher, PT [facility]   9/14/2018 8:30 AM General Amysher, PT [facility]

## 2018-08-30 ENCOUNTER — HOSPITAL ENCOUNTER (OUTPATIENT)
Dept: PHYSICAL THERAPY | Age: 73
Discharge: HOME | End: 2018-08-30
Payer: MEDICARE

## 2018-08-30 PROCEDURE — 97110 THERAPEUTIC EXERCISES: CPT | Performed by: PHYSICAL THERAPIST

## 2018-08-30 PROCEDURE — 97140 MANUAL THERAPY 1/> REGIONS: CPT | Performed by: PHYSICAL THERAPIST

## 2018-08-30 PROCEDURE — 97016 VASOPNEUMATIC DEVICE THERAPY: CPT | Performed by: PHYSICAL THERAPIST

## 2018-08-30 NOTE — PROGRESS NOTES
PT DAILY TREATMENT NOTE - [facility]  Patient Name: Timur Zaldivar Date:2018 : 1945 [x]  Patient  Verified Payor: [place] / Plan: Todd Olmstead [facility] / Product Type: Medicare / In time:1038  Out time:1130 Total Treatment Time (min): 52 Total Timed Codes (min): 52 
1:1 Treatment Time ( only): 40 Visit #:  Treatment Area: Right shoulder pain [M25.511] SUBJECTIVE Pain Level (0-10 scale): 0 Any medication changes, allergies to medications, adverse drug reactions, diagnosis change, or new procedure performed?: [x] No    [] Yes (see summary sheet for update) Subjective functional status/changes:   [] No changes reported Pt noting beginning to reach to 2nd shelf in cupboard but can't put anything on it yet OBJECTIVE 
           
Modality rationale: decrease edema, decrease inflammation and decrease pain to improve the patients ability to perform ADls. Min Type Additional Details  
   [] Estim:  []Unatt       []IFC  []Premod      
                 []Other:  []w/ice   []w/heat Position: Location:  
   [] Estim: []Att    []TENS instruct  []NMES   
                []Other:  []w/US   []w/ice   []w/heat Position: Location:  
   []  Traction: [] Cervical       []Lumbar 
                     [] Prone          []Supine 
                     []Intermittent   []Continuous Lbs: 
[] before manual 
[] after manual  
   []  Ultrasound: []Continuous   [] Pulsed 
                         []1MHz   []3MHz W/cm2: 
Location:  
   []  Iontophoresis with dexamethasone  
      Location: [] Take home patch  
[] In clinic  
   []  Ice     []  heat 
[]  Ice massage []  Laser []  Anodyne Position: Location:  
   []  Laser with stim 
[]  Other:  Position: Location:  
10 [x]  Vasopneumatic Device Pressure:       [] lo [x] med [] hi  
Temperature: [] lo [x] med [] hi  
[] Skin assessment post-treatment:  []intact []redness- no adverse reaction 
  []redness  adverse reaction:    
30 min Therapeutic Exercise:  [x] See flow sheet :  
Rationale: increase ROM to improve the patients ability to perform ADls.    
12 min Manual Therapy: Thoracic mobs PA T4 -T2 , MET for cervical thoracic junction alignment , PROM into flex, scap, ER (at 30deg) within a pain free range,  mm release pec minor , teres, and levator scapula Rationale: decrease pain, increase ROM and increase tissue extensibility to perform ADLs With 
 [] TE 
 [] TA 
 [] neuro 
 [] other: Patient Education: [x] Review HEP [] Progressed/Changed HEP based on:  
[] positioning   [] body mechanics   [] transfers   [] heat/ice application   
[] other:   
 
Other Objective/Functional Measures:  
noted C6-T4 left rotation alginment but = following manual tech ( mobilisation and MET )also noted approx 10 deg improvement in shoulder FE and abd following manual  
   
ROM :  FE  Right : 110/125 A/PROM following beginning warmup ex , end of session following manual and added ex 125/140 deg ABD right 90/105deg , end of session 100/120 deg ER : 48/58 deg , end of session 55/60 deg           
 
Manual Therapy: Thoracic mobs PA T4 -T2 , MET for cervical thoracic junction alignment , PROM into flex, scap, ER (at 30deg) within a pain free range, also manual for mm release pec minor , teres, and levator scapula Pain Level (0-10 scale) post treatment: 0 
 
ASSESSMENT/Changes in Function: responding well to mm release and other manual therapies for ROM right shoulder improvement Patient will continue to benefit from skilled PT services to modify and progress therapeutic interventions, address functional mobility deficits, address ROM deficits, address strength deficits, analyze and address soft tissue restrictions, analyze and cue movement patterns, analyze and modify body mechanics/ergonomics and assess and modify postural abnormalities to attain remaining goals. []  See Plan of Care []  See progress note/recertification 
[]  See Discharge Summary Progress towards goals / Updated goals: 
Goals for this certification period to be accomplished in 4-6 weeks: 
1. Increase right shoulder PROM flex >/= 160 to restore normal joint mobility for ADL's. Status at eval: 90 
Current: Improving up to 140 PROM  deg at end of session today 2. Increase right shoulder PROM ER >/= 60 to restore normal joint mobility for ADL's. Status at eval: 0 (@25) Current : at side 47/58 deg A/PROM progressing 3. Progress with active and strengthening phase per MD protocol. Status at eval: PROM right shoulder only Current : progressing tolerated shrugs and elbow 3#  , rows with OTB  
   
 
PLAN [x]  Upgrade activities as tolerated     [x]  Continue plan of care 
[]  Update interventions per flow sheet      
[]  Discharge due to:_ 
[]  Other:_ Molly Crowder, PT 8/30/2018  12:39 PM 
 
Future Appointments Date Time Provider Maxx Daniels 9/5/2018 8:30 AM JAYLEN Cunningham [facility]  
9/7/2018 8:30 AM JAYLEN Cunningham [facility]  
9/11/2018 8:30 AM JAYLEN Jarrell [facility]  
9/14/2018 8:30 AM JAYLEN Jarrell [facility]

## 2018-09-05 ENCOUNTER — HOSPITAL ENCOUNTER (OUTPATIENT)
Dept: PHYSICAL THERAPY | Age: 73
Discharge: HOME | End: 2018-09-05
Payer: MEDICARE

## 2018-09-05 PROCEDURE — 97110 THERAPEUTIC EXERCISES: CPT | Performed by: PHYSICAL THERAPIST

## 2018-09-05 PROCEDURE — 97140 MANUAL THERAPY 1/> REGIONS: CPT | Performed by: PHYSICAL THERAPIST

## 2018-09-05 NOTE — PROGRESS NOTES
PT DAILY TREATMENT NOTE - [facility]  Patient Name: Yogi Hightower Date:2018 : 1945 [x]  Patient  Verified Payor: [place] MEDICARE / Plan: Todd Healyy / Product Type: Medicare / In [id number]  Out time:09 Total Treatment Time (min): 30 Total Timed Codes (min): 30 
1:1 Treatment Time ( only): 25 Visit #: 25  Treatment Area: Right shoulder pain [M25.511] SUBJECTIVE Pain Level (0-10 scale): 0 Any medication changes, allergies to medications, adverse drug reactions, diagnosis change, or new procedure performed?: [x] No    [] Yes (see summary sheet for update) Subjective functional status/changes:   [] No changes reported 2 nights ago pt noting \"bone pain\" in right shoulder incideous onset while pt sitting , in evening mild ease with ice but needed to take 2 pain pills ( perkaset given) to help , till this point for last week pt hasn't needed to take pain meds but notes pain is usually achy at night . Pt requests shorter visit today preparing for a family    
 
OBJECTIVE 
   
22 min Therapeutic Exercise:  [x] See flow sheet :  
Rationale: increase ROM to improve the patients ability to perform ADls.    
8 min Manual Therapy: Thoracic mobs PA T4 -T2 , PROM into flex, scap, ER (at 30deg) within a pain free range,  mm release pec minor , and levator scapula  
   
Rationale: decrease pain, increase ROM and increase tissue extensibility to perform ADLs With 
 [] TE 
 [] TA 
 [] neuro 
 [] other: Patient Education: [x] Review HEP [] Progressed/Changed HEP based on:  
[] positioning   [] body mechanics   [] transfers   [] heat/ice application   
[] other:   
 
Other Objective/Functional Measures:  ROM :  FE  Right : 107/115 A/PROM following beginning warmup ex with UBE and pulley , end of session today 102 /120 deg ABD right 81/89deg , end of session 92/100 deg ER : 50 deg , end of session 68 deg with asssit                        
  
 Manual Therapy: Thoracic mobs PA T4 -T2 , PROM into flex, scap, ER (at side)  within a pain free range, also manual for mm release pec minor  and levator scapula  
  
 
Pain Level (0-10 scale) post treatment: 0 
 
ASSESSMENT/Changes in Function: tolerated ex , mild tighter motion today but overall  pt feels movement gained in therapy session does help at home with improved ease of movement Patient will continue to benefit from skilled PT services to modify and progress therapeutic interventions, address functional mobility deficits, address ROM deficits, address strength deficits, analyze and address soft tissue restrictions, analyze and cue movement patterns, analyze and modify body mechanics/ergonomics and assess and modify postural abnormalities to attain remaining goals. [x]  See Plan of Care 
[]  See progress note/recertification 
[]  See Discharge Summary Progress towards goals / Updated goals: 
Goals for this certification period to be accomplished in 4-6 weeks: 
1. Increase right shoulder PROM flex >/= 160 to restore normal joint mobility for ADL's. Status at eval: 90 
Current: Improving up to 140 PROM  deg at end of session 8/30/18 2. Increase right shoulder PROM ER >/= 60 to restore normal joint mobility for ADL's. Status at eval: 0 (@25) Current : at side 50/68 deg A/PROM met 3. Progress with active and strengthening phase per MD protocol. Status at eval: PROM right shoulder only Current : progressing tolerated shrugs and elbow 3#  , rows with OTB PLAN [x]  Upgrade activities as tolerated     [x]  Continue plan of care 
[]  Update interventions per flow sheet      
[]  Discharge due to:_ 
[]  Other:_ Ananya Hernandez PT 9/5/2018  8:50 AM 
 
Future Appointments Date Time Provider Maxx Daniels 9/7/2018 8:30 AM JAYLEN Langfrod [facility]  
9/11/2018 8:30 AM Niels Councilman, PT [facility]  
9/14/2018 8:30 AM Niels Councilman, PT [facility]

## 2018-09-07 ENCOUNTER — HOSPITAL ENCOUNTER (OUTPATIENT)
Dept: PHYSICAL THERAPY | Age: 73
Discharge: HOME | End: 2018-09-07
Payer: MEDICARE

## 2018-09-07 PROCEDURE — 97140 MANUAL THERAPY 1/> REGIONS: CPT | Performed by: PHYSICAL THERAPIST

## 2018-09-07 PROCEDURE — 97016 VASOPNEUMATIC DEVICE THERAPY: CPT | Performed by: PHYSICAL THERAPIST

## 2018-09-07 PROCEDURE — 97110 THERAPEUTIC EXERCISES: CPT | Performed by: PHYSICAL THERAPIST

## 2018-09-07 NOTE — PROGRESS NOTES
PT DAILY TREATMENT NOTE - [facility]  Patient Name: Adrienne Almeida Date:2018 : 1945 [x]  Patient  Verified Payor: [place] MEDICARE / Plan: Todd Lyons / Product Type: Medicare / In [id number]  Out [id number] Total Treatment Time (min): 58 Total Timed Codes (min): 48 
1:1 Treatment Time [facility] only):35 Visit #: 19 of 23 Treatment Area: Right shoulder pain [M25.511] SUBJECTIVE Pain Level (0-10 scale): 0 Any medication changes, allergies to medications, adverse drug reactions, diagnosis change, or new procedure performed?: [x] No    [] Yes (see summary sheet for update) Subjective functional status/changes:   [] No changes reported Pt has had stress from family deaths , not feeling well today as did not sleep last night , had back and leg spasms , pt feels tight today OBJECTIVE 
 
           
Modality rationale: decrease edema, decrease inflammation and decrease pain to improve the patients ability to perform ADls. Min Type Additional Details  
   [] Estim:  []Unatt       []IFC  []Premod      
                 []Other:  []w/ice   []w/heat Position: Location:  
   [] Estim: []Att    []TENS instruct  []NMES   
                []Other:  []w/US   []w/ice   []w/heat Position: Location:  
   []  Traction: [] Cervical       []Lumbar 
                     [] Prone          []Supine 
                     []Intermittent   []Continuous Lbs: 
[] before manual 
[] after manual  
   []  Ultrasound: []Continuous   [] Pulsed 
                         []1MHz   []3MHz W/cm2: 
Location:  
   []  Iontophoresis with dexamethasone  
      Location: [] Take home patch  
[] In clinic  
   []  Ice     []  heat 
[]  Ice massage []  Laser []  Anodyne Position: Location:  
   []  Laser with stim 
[]  Other:  Position: Location:  
10 [x]  Vasopneumatic Device Pressure:       [] lo [x] med [] hi  
Temperature: [] lo [x] med [] hi  
 [] Skin assessment post-treatment:  []intact []redness- no adverse reaction 
  []redness  adverse reaction:  
   
 
 
38 min Therapeutic Exercise:  [x] See flow sheet :  
Rationale: increase ROM to improve the patients ability to perform ADls.    
10 min Manual Therapy: Thoracic mobs PA T4 -T2 , PROM into flex, scap, ER (at 30deg) within pain free range,  mm release pec minor ,teres and levator scapula  
    
Rationale: decrease pain, increase ROM and increase tissue extensibility to perform ADLs With 
 [] TE 
 [] TA 
 [] neuro 
 [] other: Patient Education: [x] Review HEP [] Progressed/Changed HEP based on:  
[] positioning   [] body mechanics   [] transfers   [] heat/ice application   
[] other:   
 
Other Objective/Functional Measures:  
ROM :  FE  Right : 97/118 A/PROM following beginning warmup ex with UBE and pulley , end of session today 122 /140 deg ABD right 92/102deg , end of session 97/105 deg ER : 50 /65 deg with asssit                        
   
Manual Therapy: Thoracic mobs PA T4 -T2 , PROM into flex, scap, ER (at side)  within a pain free range, also manual for mm release pec minor, teres  and levator scapula Pain Level (0-10 scale) post treatment: 0 
 
ASSESSMENT/Changes in Function: cues on mechanics for abd , pt with accessory motions cues to depress shoulder during motion , continues to respond well to manual tech allowing increase ease with ROM Patient will continue to benefit from skilled PT services to modify and progress therapeutic interventions, address functional mobility deficits, address ROM deficits, address strength deficits, analyze and address soft tissue restrictions, analyze and cue movement patterns, analyze and modify body mechanics/ergonomics, assess and modify postural abnormalities and address imbalance/dizziness to attain remaining goals. [x]  See Plan of Care 
[]  See progress note/recertification 
[]  See Discharge Summary Progress towards goals / Updated goals: 
Goals for this certification period to be accomplished in 4-6 weeks: 
1. Increase right shoulder PROM flex >/= 160 to restore normal joint mobility for ADL's. Status at eval: 90 
Current: Improving up to 140 PROM   
2. Increase right shoulder PROM ER >/= 60 to restore normal joint mobility for ADL's. Status at eval: 0 (@25) Current : at side 50/68 deg A/PROM met 3. Progress with active and strengthening phase per MD protocol. Status at eval: PROM right shoulder only Current : progressing tolerated shrugs and elbow 3#  , rows with OTB  
  
 
PLAN [x]  Upgrade activities as tolerated     [x]  Continue plan of care 
[]  Update interventions per flow sheet      
[]  Discharge due to:_ 
[]  Other:_ Jimmy Roldan PT 9/7/2018  9:33 AM 
 
Future Appointments Date Time Provider Maxx Daniels 9/11/2018 8:30 AM JAYLEN Rothman [facility]  
9/14/2018 8:30 AM JAYLEN Rothman [facility]

## 2018-09-11 ENCOUNTER — APPOINTMENT (OUTPATIENT)
Dept: PHYSICAL THERAPY | Age: 73
End: 2018-09-11
Payer: MEDICARE

## 2018-09-14 ENCOUNTER — APPOINTMENT (OUTPATIENT)
Dept: PHYSICAL THERAPY | Age: 73
End: 2018-09-14
Payer: MEDICARE

## 2018-09-20 ENCOUNTER — HOSPITAL ENCOUNTER (OUTPATIENT)
Dept: PHYSICAL THERAPY | Age: 73
Discharge: HOME | End: 2018-09-20
Payer: MEDICARE

## 2018-09-20 PROCEDURE — 97140 MANUAL THERAPY 1/> REGIONS: CPT

## 2018-09-20 PROCEDURE — 97016 VASOPNEUMATIC DEVICE THERAPY: CPT

## 2018-09-20 PROCEDURE — 97112 NEUROMUSCULAR REEDUCATION: CPT

## 2018-09-20 NOTE — PROGRESS NOTES
PT DAILY TREATMENT NOTE - [facility]  Patient Name: Deedee Desai Date:2018 : 1945 [x]  Patient  Verified Payor: [place] MEDICARE / Plan: Todd Olmstead [facility] / Product Type: Medicare / In time:300  Out time:401 Total Treatment Time (min): 61 Total Timed Codes (min): 51 
1:1 Treatment Time ( [address] only): 51 Visit #: 20 of 32 Treatment Area: Right shoulder pain [M25.511] SUBJECTIVE Pain Level (0-10 scale): 0/10 Any medication changes, allergies to medications, adverse drug reactions, diagnosis change, or new procedure performed?: [x] No    [] Yes (see summary sheet for update) Subjective functional status/changes:   [] No changes reported Pt reports that she still has crawling in her neck and arm but the doctor says it is a nerve. OBJECTIVE Modality rationale: decrease inflammation and decrease pain to improve the patients ability to complete ADls Min Type Additional Details  
 [] Estim:  []Unatt       []IFC  []Premod []Other:  []w/ice   []w/heat Position: Location:  
 [] Estim: []Att    []TENS instruct  []NMES []Other:  []w/US   []w/ice   []w/heat Position: Location:  
 []  Traction: [] Cervical       []Lumbar 
                     [] Prone          []Supine []Intermittent   []Continuous Lbs: 
[] before manual 
[] after manual  
 []  Ultrasound: []Continuous   [] Pulsed []1MHz   []3MHz W/cm2: 
Location:  
 []  Iontophoresis with dexamethasone Location: [] Take home patch  
[] In clinic  
 []  Ice     []  heat 
[]  Ice massage 
[]  Laser  
[]  Anodyne Position: Location:  
 []  Laser with stim 
[]  Other:  Position: Location:  
10 [x]  Vasopneumatic Device Pressure:       [] lo [x] med [] hi  
Temperature: [] lo [x] med [] hi  
[x] Skin assessment post-treatment:  [x]intact [x]redness- no adverse reaction 
  []redness  adverse reaction:  
  
41 
1:1 
 20 min Neuromuscular Re-education:  []  See flow sheet :  
Rationale: increase ROM, increase strength, improve coordination and increase proprioception  to improve the patients ability to complete ADLs 10 min Manual Therapy:  SOR, DTM to clavicular insertions of levator scapulae and UT on right side, PROM, myofascial arm pull on right Rationale: decrease pain, increase ROM, increase tissue extensibility, decrease edema  and decrease trigger points to complete ADLs With 
 [] TE 
 [] TA [x] neuro 
 [] other: Patient Education: [x] Review HEP [] Progressed/Changed HEP based on:  
[x] positioning   [x] body mechanics   [] transfers   [x] heat/ice application   
[] other:   
 
Other Objective/Functional Measures:    
 
Pain Level (0-10 scale) post treatment: 0/10 ASSESSMENT/Changes in Function: Pt demonstrates significant myofascial restrictions on right side of neck into clavicular area contributing to radicular pain. Pt continues to have AROm restrictions as well due to these restrictions. Patient will continue to benefit from skilled PT services to address functional mobility deficits, address ROM deficits, address strength deficits, analyze and address soft tissue restrictions, analyze and cue movement patterns, analyze and modify body mechanics/ergonomics and assess and modify postural abnormalities to attain remaining goals. []  See Plan of Care 
[]  See progress note/recertification 
[]  See Discharge Summary Progress towards goals / Updated goals: 
Goals for this certification period to be accomplished in 4-6 weeks: 
1. Increase right shoulder PROM flex >/= 160 to restore normal joint mobility for ADL's. Status at eval: 90 
Current: Improving up to 140 PROM   
2. Increase right shoulder PROM ER >/= 60 to restore normal joint mobility for ADL's. Status at eval: 0 (@25) Current : at side 50/68 deg A/PROM met 3. Progress with active and strengthening phase per MD protocol. Status at eval: PROM right shoulder only Current : progressing tolerated shrugs and elbow 3#  , rows with OTB  
  
 
PLAN 
[]  Upgrade activities as tolerated     [x]  Continue plan of care 
[]  Update interventions per flow sheet      
[]  Discharge due to:_ 
[]  Other:_ Francesca Argueta PTA 9/20/2018  1:03 PM 
 
Future Appointments Date Time Provider Maxx Daniels 9/20/2018 3:00 PM Francesca Argueta PTA [facility]

## 2018-09-20 NOTE — PROGRESS NOTES
[facility] at [address] [place] [address], [address] [place] [address] Street Phone: [phone]   Fax: [phone] Continued Plan of Care/ Re-certification for Physical Therapy Services Patient name: Delia Dumont Start of Care:7/10/2018 Referral source: Megan Reid MD : 1945 Medical/Treatment Diagnosis: Right shoulder pain [M25.511] Onset Date:18 surgery Prior Hospitalization: see medical history Provider#: [id number] Medications: Verified on Patient Summary List   
Comorbidities: DM, arthritis, HTN Prior Level of Function:chronic right shoulder pain with ADL's 
  
Visits from Start of Care: 19    Missed Visits: 0 The Plan of Care and following information is based on the patient's current status: 
Goals for this certification period to be accomplished in 4-6 weeks: 
1. Increase right shoulder PROM flex >/= 160 to restore normal joint mobility for ADL's. Status at eval: 90 
Current: Improving up to 140 PROM  , progressing 2. Increase right shoulder PROM ER >/= 60 to restore normal joint mobility for ADL's. Status at eval: 0 (@25) Current : at side 50/68 deg A/PROM MET 3. Progress with active and strengthening phase per MD protocol. Status at eval: PROM right shoulder only Current : progressing tolerated shrugs and elbow 3#  , rows with OTB will progress as tolerated with strengthening . Surgery was on 18 , 10 weeks post op on recert date. Updated : Progress strength goal to allow pt to lift dishes up to cupboard shelf with good control to put dishes away at home  
  
Key functional changes: responding well to manual tech for increase ease with ROM and pt noting improved ease with motion during functional reaching and grooming activities at home. No more pain radiating up to face or into her neck on right but she is c/o \"crawling\" sensation into left shoulder to thumb Problems/ barriers to goal attainment: accessory motions with shoulder motion noted but will continue to cue and progress , pt noted recent \"bone pain \" and continues to be achy especially at night Problem List: pain affecting function, decrease ROM, decrease strength, edema affecting function, decrease ADL/ functional abilitiies, decrease activity tolerance, decrease flexibility/ joint mobility and decrease transfer abilities Treatment Plan: Therapeutic exercise, Therapeutic activities, Neuromuscular re-education, Physical agent/modality, Manual therapy, Aquatic therapy, Patient education, Self Care training, Functional mobility training and Home safety training Patient Goal (s) has been updated and includes: working toward less pain/ache at night and continue to improve motion in shoulder Goals for this certification period to be accomplished in 4 weeks: 
Continue toward unmet goals Progress strength goal to allow pt to lift dishes up to cupboard shelf with good control to put dishes away at home Frequency / Duration: Patient to be seen 2 times per week for 4 weeks: 
Assessment / Recommendations:continue toward unmet goals G-Codes (GP) Carry  Current  CK= 40-59%  Goal  CJ= 20-39% The severity rating is based on clinical judgment and the FOTO score. Certification Period: 9/07/2018-11/05/18 Akin Barbour, PT 9/20/2018 1:17 PM 
 
________________________________________________________________________ I certify that the above Therapy Services are being furnished while the patient is under my care. I agree with the treatment plan and certify that this therapy is necessary. [] I have read the above and request that my patient continue as recommended. [] I have read the above report and request that my patient continue therapy with the following changes/special instructions: ______________________________________ [] I have read the above report and request that my patient be discharged from therapy [de-identified] Signature:____________Date:_________TIME:________ 
 
[facility], Date and Time must be completed for valid certification ** Please sign and return to [facility] at [address] [place] [address], [address] [place] [address] Phone: [phone]   Fax: [phone]

## 2018-09-26 ENCOUNTER — HOSPITAL ENCOUNTER (OUTPATIENT)
Dept: PHYSICAL THERAPY | Age: 73
Discharge: HOME | End: 2018-09-26
Payer: MEDICARE

## 2018-09-26 PROCEDURE — 97016 VASOPNEUMATIC DEVICE THERAPY: CPT

## 2018-09-26 PROCEDURE — 97140 MANUAL THERAPY 1/> REGIONS: CPT

## 2018-09-26 PROCEDURE — 97112 NEUROMUSCULAR REEDUCATION: CPT

## 2018-09-26 NOTE — PROGRESS NOTES
PT DAILY TREATMENT NOTE - [facility]  Patient Name: Jenniffer De Jesus Date:2018 : 1945 [x]  Patient  Verified Payor: [place] / Plan: Todd Olmstead y / Product Type: Medicare / In [id number]:   Out time:510 Total Treatment Time (min): 43 Total Timed Codes (min): 33 
1:1 Treatment Time ( only): 33 Visit #: 21 of 32 Treatment Area: Right shoulder pain [M25.511] SUBJECTIVE Pain Level (0-10 scale): 0/10 Any medication changes, allergies to medications, adverse drug reactions, diagnosis change, or new procedure performed?: [x] No    [] Yes (see summary sheet for update) Subjective functional status/changes:   [] No changes reported Pt reports that her neck has been really bothering her OBJECTIVE Modality rationale: decrease inflammation and decrease pain to improve the patients ability to complete ADls Min Type Additional Details  
 [] Estim:  []Unatt       []IFC  []Premod []Other:  []w/ice   []w/heat Position: Location:  
 [] Estim: []Att    []TENS instruct  []NMES []Other:  []w/US   []w/ice   []w/heat Position: Location:  
 []  Traction: [] Cervical       []Lumbar 
                     [] Prone          []Supine []Intermittent   []Continuous Lbs: 
[] before manual 
[] after manual  
 []  Ultrasound: []Continuous   [] Pulsed []1MHz   []3MHz W/cm2: 
Location:  
 []  Iontophoresis with dexamethasone Location: [] Take home patch  
[] In clinic  
 []  Ice     []  heat 
[]  Ice massage 
[]  Laser  
[]  Anodyne Position: Location:  
 []  Laser with stim 
[]  Other:  Position: Location:  
10 [x]  Vasopneumatic Device Pressure:       [] lo [x] med [] hi  
Temperature: [] lo [x] med [] hi  
[x] Skin assessment post-treatment:  [x]intact [x]redness- no adverse reaction 23 min Neuromuscular Re-education:  []  See flow sheet :  
 Rationale: increase ROM, increase strength, improve coordination and increase proprioception  to improve the patients ability to complete ADls 10 min Manual Therapy:  SOR, DTM to bicep on right, DTM to clavicular insertions of scalenes, SCM, UT and LS, PROM Rationale: decrease pain, increase ROM, increase tissue extensibility, decrease edema  and decrease trigger points to complete ADLs With 
 [] TE 
 [] TA [x] neuro 
 [] other: Patient Education: [x] Review HEP [] Progressed/Changed HEP based on:  
[x] positioning   [x] body mechanics   [] transfers   [x] heat/ice application   
[] other:   
 
Other Objective/Functional Measures:  
 
Pain Level (0-10 scale) post treatment: 0/10 ASSESSMENT/Changes in Function: Pt demonstrates edema in right side of neck and bicep contributing to difficulty with mobility and strength. She tolerated addition of supine and sidelying resisted strengthening for abductors and flexors. Patient will continue to benefit from skilled PT services to address functional mobility deficits, address ROM deficits, address strength deficits, analyze and address soft tissue restrictions, analyze and cue movement patterns and analyze and modify body mechanics/ergonomics to attain remaining goals. []  See Plan of Care 
[]  See progress note/recertification 
[]  See Discharge Summary Progress towards goals / Updated goals: 
Goals for this certification period to be accomplished in 4-6 weeks: 
1. Increase right shoulder PROM flex >/= 160 to restore normal joint mobility for ADL's. Status at eval: 90 
Current: Improving up to 140 PROM   
2. Increase right shoulder PROM ER >/= 60 to restore normal joint mobility for ADL's. Status at eval: 0 (@25) Current : at side 50/68 deg A/PROM met 3. Progress with active and strengthening phase per MD protocol. Status at eval: PROM right shoulder only Current : progressing tolerated shrugs and elbow 3#  , rows with OTB  
  
 
PLAN 
 []  Upgrade activities as tolerated     []  Continue plan of care 
[]  Update interventions per flow sheet      
[]  Discharge due to:_ 
[]  Other:_ Jing Gardner PTA 9/26/2018  4:03 PM 
 
Future Appointments Date Time Provider Maxx Daniels 9/26/2018 4:30 PM Jing Gardner PTA [facility]  
9/28/2018 8:30 AM Shabana Marinelli, PT [facility]  
10/1/2018 8:45 AM Jing Gardner PTA [address] [facility]  
10/4/2018 8:45 AM Jing Gardner PTA [address] [facility]  
10/8/2018 8:45 AM Jing Gardner PTA [address] [facility]  
10/11/2018 8:30 AM Glenna Ku, PT [address] [facility]  
10/15/2018 8:30 AM Glenna Ku, PT [address] [facility]  
10/17/2018 8:30 AM Jing Gardner PTA [address] [facility]

## 2018-09-28 ENCOUNTER — HOSPITAL ENCOUNTER (OUTPATIENT)
Dept: PHYSICAL THERAPY | Age: 73
Discharge: HOME | End: 2018-09-28
Payer: MEDICARE

## 2018-09-28 PROCEDURE — 97110 THERAPEUTIC EXERCISES: CPT

## 2018-09-28 PROCEDURE — 97140 MANUAL THERAPY 1/> REGIONS: CPT

## 2018-09-28 NOTE — PROGRESS NOTES
PT DAILY TREATMENT NOTE - [facility]  Patient Name: Kike Mensah Date:2018 : 1945 [x]  Patient  Verified Payor: [place] / Plan: Todd Lyons / Product Type: Medicare / In time:8:30  Out time: 9:18 Total Treatment Time (min): 48 Total Timed Codes (min): 48 
1:1 Treatment Time ( only): 33 Visit #: 22 of 27 Treatment Area: Right shoulder pain [M25.511] SUBJECTIVE Pain Level (0-10 scale): 0/10 Any medication changes, allergies to medications, adverse drug reactions, diagnosis change, or new procedure performed?: [x] No    [] Yes (see summary sheet for update) Subjective functional status/changes:   [] No changes reported Patient stated that her right shoulder gets intermittent aching in the joint OBJECTIVE Modality rationale: PD due to [facility] Min Type Additional Details  
 [] Estim:  []Unatt       []IFC  []Premod []Other:  []w/ice   []w/heat Position: Location:  
 [] Estim: []Att    []TENS instruct  []NMES []Other:  []w/US   []w/ice   []w/heat Position: Location:  
 []  Traction: [] Cervical       []Lumbar 
                     [] Prone          []Supine []Intermittent   []Continuous Lbs: 
[] before manual 
[] after manual  
 []  Ultrasound: []Continuous   [] Pulsed []1MHz   []3MHz W/cm2: 
Location:  
 []  Iontophoresis with dexamethasone Location: [] Take home patch  
[] In clinic  
 []  Ice     []  heat 
[]  Ice massage 
[]  Laser  
[]  Anodyne Position: Location:  
 []  Laser with stim 
[]  Other:  Position: Location:  
 []  Vasopneumatic Device Pressure:       [] lo [] med [] hi  
Temperature: [] lo [] med [] hi  
[] Skin assessment post-treatment:  []intact []redness- no adverse reaction 
  []redness  adverse reaction:  
 
33 min Therapeutic Exercise:  [x] See flow sheet :  
Rationale: increase ROM and increase strength to improve the patients ability to perform ADls. 15 min Manual Therapy:  STM to right UT/LS; Scap mobs, PROM within a pain free range. Rationale: decrease pain, increase ROM and increase tissue extensibility to increase ease with ADLs. With 
 [] TE 
 [] TA 
 [] neuro 
 [] other: Patient Education: [x] Review HEP [] Progressed/Changed HEP based on:  
[] positioning   [] body mechanics   [] transfers   [] heat/ice application   
[] other:   
 
Other Objective/Functional Measures: 
Patient had mild UT compensation with overhead cone reach onto the window sill, but with VC from therapist patient was able to correct form. Pain Level (0-10 scale) post treatment: 0/10 ASSESSMENT/Changes in Function: Trigger point/tenderness noted at right levator scap region. Patient exhibits tightness present at end range of PROM into flex, scaption, and ER. Patient reported no pain at end of the session. Patient will continue to benefit from skilled PT services to modify and progress therapeutic interventions, address functional mobility deficits, address ROM deficits, address strength deficits, analyze and address soft tissue restrictions, analyze and cue movement patterns and assess and modify postural abnormalities to attain remaining goals. []  See Plan of Care 
[]  See progress note/recertification 
[]  See Discharge Summary Progress towards goals / Updated goals: 
Goals for this certification period to be accomplished in 4-6 weeks: 
1. Increase right shoulder PROM flex >/= 160 to restore normal joint mobility for ADL's. Status at eval: 90 
Current: Improving up to 140 PROM   
2. Increase right shoulder PROM ER >/= 60 to restore normal joint mobility for ADL's. Status at eval: 0 (@25) Current : at side 50/68 deg A/PROM met 3. Progress with active and strengthening phase per MD protocol. Status at eval: PROM right shoulder only Current : progressing tolerated shrugs and elbow 3#  , rows with OTB  
  
 
PLAN 
 []  Upgrade activities as tolerated     [x]  Continue plan of care 
[]  Update interventions per flow sheet      
[]  Discharge due to:_ 
[]  Other:_    
 
Dyana Mitchell PT 9/28/2018  8:57 AM 
 
Future Appointments Date Time Provider Maxx Daniels 10/1/2018 8:45 AM Darien Downey [facility]  
10/4/2018 8:45 AM Darien Downey [facility]  
10/8/2018 8:45 AM Darien Downey PTA [facility]  
10/11/2018 8:30 AM Glenna Saldana [facility]  
10/15/2018 8:30 AM Glenna Saldana [facility]  
10/17/2018 8:30 AM Darien Downey [facility]

## 2018-10-01 ENCOUNTER — HOSPITAL ENCOUNTER (OUTPATIENT)
Dept: PHYSICAL THERAPY | Age: 73
Discharge: HOME | End: 2018-10-01
Payer: MEDICARE

## 2018-10-01 PROCEDURE — 97140 MANUAL THERAPY 1/> REGIONS: CPT

## 2018-10-01 PROCEDURE — 97112 NEUROMUSCULAR REEDUCATION: CPT

## 2018-10-01 NOTE — PROGRESS NOTES
PT DAILY TREATMENT NOTE - [facility]  Patient Name: Harpal Maurer Date:10/1/2018 : 1945 [x]  Patient  Verified Payor: [place] MED[place]ARE / Plan: Todd Lyons / Product Type: Medicare / In time:853  Out time:945 Total Treatment Time (min): 52 Total Timed Codes (min): 42 
1:1 Treatment Time ( only): 42 Visit #: 23 of 27 Treatment Area: Right shoulder pain [M25.511] SUBJECTIVE Pain Level (0-10 scale): 0/10 Any medication changes, allergies to medications, adverse drug reactions, diagnosis change, or new procedure performed?: [x] No    [] Yes (see summary sheet for update) Subjective functional status/changes:   [] No changes reported Pt reports that she fell the other day but she is feeling fine now. OBJECTIVE Modality rationale: decrease pain and increase tissue extensibility to improve the patients ability to complete ADLs Min Type Additional Details  
 [] Estim:  []Unatt       []IFC  []Premod []Other:  []w/ice   []w/heat Position: Location:  
 [] Estim: []Att    []TENS instruct  []NMES []Other:  []w/US   []w/ice   []w/heat Position: Location:  
 []  Traction: [] Cervical       []Lumbar 
                     [] Prone          []Supine []Intermittent   []Continuous Lbs: 
[] before manual 
[] after manual  
 []  Ultrasound: []Continuous   [] Pulsed []1MHz   []3MHz W/cm2: 
Location:  
 []  Iontophoresis with dexamethasone Location: [] Take home patch  
[] In clinic  
10 []  Ice     [x]  heat 
[]  Ice massage 
[]  Laser  
[]  Anodyne Position:seatedLocation:shoulder  
 []  Laser with stim 
[]  Other:  Position: Location:  
 []  Vasopneumatic Device Pressure:       [] lo [] med [] hi  
Temperature: [] lo [] med [] hi  
[x] Skin assessment post-treatment:  [x]intact [x]redness- no adverse reaction 32 min Neuromuscular Re-education:  []  See flow sheet :  
 Rationale: increase ROM, increase strength, improve coordination, improve balance and increase proprioception  to improve the patients ability to complete ADLs 10 min Manual Therapy: myofascial arm pull on right, PROM Rationale: decrease pain, increase ROM, increase tissue extensibility, decrease edema  and decrease trigger points to complete ADLs With 
 [] TE 
 [] TA 
 [] neuro 
 [] other: Patient Education: [x] Review HEP [] Progressed/Changed HEP based on:  
[] positioning   [] body mechanics   [] transfers   [] heat/ice application   
[] other:   
 
Other Objective/Functional Measures:    
 
Pain Level (0-10 scale) post treatment: 0/10 ASSESSMENT/Changes in Function: Pt demonstrates improving mobility of arm with today's treatment. She continues to have limited sh extension and abduction. Patient will continue to benefit from skilled PT services to address functional mobility deficits, address ROM deficits, address strength deficits, analyze and address soft tissue restrictions and analyze and cue movement patterns to attain remaining goals. []  See Plan of Care 
[]  See progress note/recertification 
[]  See Discharge Summary Progress towards goals / Updated goals: 
Goals for this certification period to be accomplished in 4-6 weeks: 
1. Increase right shoulder PROM flex >/= 160 to restore normal joint mobility for ADL's. Status at eval: 90 
Current: Improving up to 140 PROM   
2. Increase right shoulder PROM ER >/= 60 to restore normal joint mobility for ADL's. Status at eval: 0 (@25) Current : at side 50/68 deg A/PROM met 3. Progress with active and strengthening phase per MD protocol. Status at eval: PROM right shoulder only Current : progressing tolerated shrugs and elbow 3#  , rows with OTB PLAN 
[]  Upgrade activities as tolerated     []  Continue plan of care 
[]  Update interventions per flow sheet      
[]  Discharge due to:_ 
[]  Other:_   
 
 Lieutenant Mu PTA 10/1/2018  10:57 AM 
 
Future Appointments Date Time Provider Maxx Daniels 10/4/2018 8:45 AM Lieutenant Mu PTA [facility]  
10/8/2018 8:45 AM Lieutenant Mu PTA [facility]  
10/11/2018 8:30 AM Glenna Kauffman [facility]  
10/15/2018 8:30 AM Glenna Kauffman PT [facility]  
10/17/2018 8:30 AM Lieutenant Mu PTA [facility]

## 2018-10-04 ENCOUNTER — HOSPITAL ENCOUNTER (OUTPATIENT)
Dept: PHYSICAL THERAPY | Age: 73
Discharge: HOME | End: 2018-10-04
Payer: MEDICARE

## 2018-10-04 PROCEDURE — 97112 NEUROMUSCULAR REEDUCATION: CPT

## 2018-10-04 PROCEDURE — 97140 MANUAL THERAPY 1/> REGIONS: CPT

## 2018-10-04 NOTE — PROGRESS NOTES
PT DAILY TREATMENT NOTE - [facility]  Patient Name: Salas Olmstead Date:10/4/2018 : 1945 [x]  Patient  Verified Payor: [place] MEDICARE / Plan: [address] / Product Type: Medicare / In time:845  Out time:937 Total Treatment Time (min): 52 Total Timed Codes (min): 42 
1:1 Treatment Time ( only): 42 Visit #: 24 of 30 Treatment Area: Right shoulder pain [M25.511] SUBJECTIVE Pain Level (0-10 scale): 0/10 Any medication changes, allergies to medications, adverse drug reactions, diagnosis change, or new procedure performed?: [x] No    [] Yes (see summary sheet for update) Subjective functional status/changes:   [] No changes reported Pt reports that she just feels achy everywhere OBJECTIVE Modality rationale: decrease pain and increase tissue extensibility to improve the patients ability to complete ADLs Min Type Additional Details  
 [] Estim:  []Unatt       []IFC  []Premod []Other:  []w/ice   []w/heat Position: Location:  
 [] Estim: []Att    []TENS instruct  []NMES []Other:  []w/US   []w/ice   []w/heat Position: Location:  
 []  Traction: [] Cervical       []Lumbar 
                     [] Prone          []Supine []Intermittent   []Continuous Lbs: 
[] before manual 
[] after manual  
 []  Ultrasound: []Continuous   [] Pulsed []1MHz   []3MHz W/cm2: 
Location:  
 []  Iontophoresis with dexamethasone Location: [] Take home patch  
[] In clinic  
10 []  Ice     [x]  heat 
[]  Ice massage 
[]  Laser  
[]  Anodyne Position:supineLocation:right shoulder  
 []  Laser with stim 
[]  Other:  Position: Location:  
 []  Vasopneumatic Device Pressure:       [] lo [] med [] hi  
Temperature: [] lo [] med [] hi  
[x] Skin assessment post-treatment:  [x]intact [x]redness- no adverse reaction 
  []redness  adverse reaction: 32 min Neuromuscular Re-education:  []  See flow sheet :facilitate clavicular rotation Rationale: increase ROM, increase strength, improve coordination, improve balance and increase proprioception  to improve the patients ability to put dishes on shelves 10 min Manual Therapy:  SOR, DTM to scalenes, SCM, pec major, PROM Rationale: decrease pain, increase ROM, increase tissue extensibility, decrease edema  and decrease trigger points to complete ADLs With 
 [] TE 
 [] TA [x] neuro 
 [] other: Patient Education: [x] Review HEP [] Progressed/Changed HEP based on:  
[] positioning   [x] body mechanics   [] transfers   [x] heat/ice application   
[] other:   
 
Other Objective/Functional Measures:   
 
Pain Level (0-10 scale) post treatment:0/10 ASSESSMENT/Changes in Function: Pt demonstrates myofascial restrictions ;limting clavicular rotation and shoulder mobility above 120 degrees. Added overhead work and PNF patterns to improve function Patient will continue to benefit from skilled PT services to address functional mobility deficits, address ROM deficits, address strength deficits, analyze and address soft tissue restrictions, analyze and cue movement patterns, analyze and modify body mechanics/ergonomics and assess and modify postural abnormalities to attain remaining goals. []  See Plan of Care 
[]  See progress note/recertification 
[]  See Discharge Summary Progress towards goals / Updated goals: 
Goals for this certification period to be accomplished in 4-6 weeks: 
1. Increase right shoulder PROM flex >/= 160 to restore normal joint mobility for ADL's. Status at eval: 90 
Current: Improving up to 140 PROM   
2. Increase right shoulder PROM ER >/= 60 to restore normal joint mobility for ADL's. Status at eval: 0 (@25) Current : at side 50/68 deg A/PROM met 3. Progress with active and strengthening phase per MD protocol. Status at eval: PROM right shoulder only Current : progressing tolerated shrugs and elbow 3#  , rows with OTB PLAN 
[]  Upgrade activities as tolerated     []  Continue plan of care 
[]  Update interventions per flow sheet      
[]  Discharge due to:_ 
[]  Other:_ Willow Swartz PTA 10/4/2018  8:49 AM 
 
Future Appointments Date Time Provider Maxx Daniels 10/8/2018 8:45 AM Willow Swartz PTA [facility]  
10/11/2018 8:30 AM Glenna Miller PT [facility]  
10/15/2018 8:30 AM Glenna Miller PT [facility]  
10/17/2018 8:30 AM Willow Swartz PTA [facility]

## 2018-10-08 ENCOUNTER — HOSPITAL ENCOUNTER (OUTPATIENT)
Dept: PHYSICAL THERAPY | Age: 73
Discharge: HOME | End: 2018-10-08
Payer: MEDICARE

## 2018-10-08 PROCEDURE — 97140 MANUAL THERAPY 1/> REGIONS: CPT

## 2018-10-08 PROCEDURE — 97112 NEUROMUSCULAR REEDUCATION: CPT

## 2018-10-08 NOTE — PROGRESS NOTES
PT DAILY TREATMENT NOTE - [facility]  Patient Name: Lalo Hickory Date:10/8/2018 : 1945 [x]  Patient  Verified Payor: [place] MEDICARE / Plan: Todd Healyy / Product Type: Medicare / In time:845  Out time:941 Total Treatment Time (min): 56 Total Timed Codes (min): 46 
1:1 Treatment Time ( [address] only): 46 Visit #: 25 of 30 Treatment Area: Right shoulder pain [M25.511] SUBJECTIVE Pain Level (0-10 scale): 0/10 Any medication changes, allergies to medications, adverse drug reactions, diagnosis change, or new procedure performed?: [x] No    [] Yes (see summary sheet for update) Subjective functional status/changes:   [] No changes reported Pt states that she is still struggling with lifting her arm over her head OBJECTIVE Modality rationale: decrease inflammation and decrease pain to improve the patients ability to complete ADls Min Type Additional Details  
 [] Estim:  []Unatt       []IFC  []Premod []Other:  []w/ice   []w/heat Position: Location:  
 [] Estim: []Att    []TENS instruct  []NMES []Other:  []w/US   []w/ice   []w/heat Position: Location:  
 []  Traction: [] Cervical       []Lumbar 
                     [] Prone          []Supine []Intermittent   []Continuous Lbs: 
[] before manual 
[] after manual  
 []  Ultrasound: []Continuous   [] Pulsed []1MHz   []3MHz W/cm2: 
Location:  
 []  Iontophoresis with dexamethasone Location: [] Take home patch  
[] In clinic  
10 [x]  Ice     []  heat 
[]  Ice massage 
[]  Laser  
[]  Anodyne Position:supine/seatedLocation:left shoulder  
 []  Laser with stim 
[]  Other:  Position: Location:  
 []  Vasopneumatic Device Pressure:       [] lo [] med [] hi  
Temperature: [] lo [] med [] hi  
[x] Skin assessment post-treatment:  [x]intact [x]redness- no adverse reaction 36 min Neuromuscular Re-education:  []  See flow sheet :facilitate clavicular rotation to improve AROm Rationale: increase ROM, increase strength, improve coordination and increase proprioception  to improve the patients ability to complete ADLs 10 min Manual Therapy: MFR to pec major, AC joint, myofascial arm pull, PROM Rationale: decrease pain, increase ROM, increase tissue extensibility, decrease edema  and decrease trigger points to complete ADLs With 
 [] TE 
 [] TA [x] neuro 
 [] other: Patient Education: [x] Review HEP [] Progressed/Changed HEP based on:  
[x] positioning   [x] body mechanics   [] transfers   [x] heat/ice application   
[] other:   
 
Other Objective/Functional Measures:   
 
Pain Level (0-10 scale) post treatment: 0/10 ASSESSMENT/Changes in Function:Pt demonstrates mild strength and AROm improvement today. She continues to be limited with range over 120 degrees actively. She does note that it is getting easier to complete tasks overhead at home indicating improvement Patient will continue to benefit from skilled PT services to address functional mobility deficits, address ROM deficits, address strength deficits, analyze and address soft tissue restrictions, analyze and cue movement patterns, analyze and modify body mechanics/ergonomics, assess and modify postural abnormalities and address imbalance/dizziness to attain remaining goals. []  See Plan of Care 
[]  See progress note/recertification 
[]  See Discharge Summary Progress towards goals / Updated goals: 
Goals for this certification period to be accomplished in 4-6 weeks: 
1. Increase right shoulder PROM flex >/= 160 to restore normal joint mobility for ADL's. Status at eval: 90 
Current: Improving up to 150 AROM, 160 PROM, MET 
2. Increase right shoulder PROM ER >/= 60 to restore normal joint mobility for ADL's. Status at eval: 0 (@25) Current : at side 50/68 deg A/PROM met 3. Progress with active and strengthening phase per MD protocol. Status at eval: PROM right shoulder only Current : progressing  With repetitions and weight. 
  
 
PLAN [x]  Upgrade activities as tolerated     [x]  Continue plan of care 
[]  Update interventions per flow sheet      
[]  Discharge due to:_ 
[]  Other:_ Gertrudis Hernandez PTA 10/8/2018  8:50 AM 
 
Future Appointments Date Time Provider Maxx Daniels 10/11/2018 8:30 AM Glenna Sanabria PT [facility]  
10/15/2018 8:30 AM Glenna Sanabria PT [facility]  
10/17/2018 8:30 AM Gertrudis Hernandez PTA [facility]

## 2018-10-11 ENCOUNTER — APPOINTMENT (OUTPATIENT)
Dept: PHYSICAL THERAPY | Age: 73
End: 2018-10-11
Payer: MEDICARE

## 2018-10-15 ENCOUNTER — HOSPITAL ENCOUNTER (OUTPATIENT)
Dept: PHYSICAL THERAPY | Age: 73
Discharge: HOME | End: 2018-10-15
Payer: MEDICARE

## 2018-10-15 PROCEDURE — 97112 NEUROMUSCULAR REEDUCATION: CPT

## 2018-10-15 NOTE — PROGRESS NOTES
PT DAILY TREATMENT NOTE - [facility]  Patient Name: Ara Stone Date:10/15/2018 : 1945 [x]  Patient  Verified Payor: [place] / Plan: [facility] [employer] / Product Type: Medicare / In time:130  Out time:210 Total Treatment Time (min): 40 Total Timed Codes (min): 40 
1:1 Treatment Time ( only): 40 Visit #: 26 of 27 Treatment Area: Right shoulder pain [M25.511] SUBJECTIVE Pain Level (0-10 scale): 0/10 Any medication changes, allergies to medications, adverse drug reactions, diagnosis change, or new procedure performed?: [x] No    [] Yes (see summary sheet for update) Subjective functional status/changes:   [] No changes reported Pt reports that her MD has released her from treatment and encourages her to continue working on her exercises at home to improve overall mobility and strength OBJECTIVE 40 min Neuromuscular Re-education:  []  See flow sheet :facilitate scapular strengt Rationale: increase ROM, increase strength, improve coordination, improve balance and increase proprioception  to improve the patients ability to complete ADls With 
 [] TE 
 [] TA [x] neuro 
 [] other: Patient Education: [x] Review HEP [x] Progressed/Changed HEP based on:  
[] positioning   [] body mechanics   [] transfers   [] heat/ice application   
[] other:   
 
Other Objective/Functional Measures Pain Level (0-10 scale) post treatment: 0/10 ASSESSMENT/Changes in Function: Pt demonstrates improved function and mobility however continues to have overcompensation of upper trapezius contribuitng to ongoing pain in neck. Pt provided with HEP so she can continue to build strength in shoulder and scapular muscles. Pt also provided with resources to pursue massage to help reduce excessive tone in upper trapezius.  
 
Patient will continue to benefit from skilled PT services to address functional mobility deficits, address ROM deficits, address strength deficits, analyze and address soft tissue restrictions, analyze and cue movement patterns, analyze and modify body mechanics/ergonomics and assess and modify postural abnormalities to attain remaining goals. []  See Plan of Care 
[]  See progress note/recertification 
[]  See Discharge Summary Progress towards goals / Updated goals: 
Goals for this certification period to be accomplished in 4-6 weeks: 
1. Increase right shoulder PROM flex >/= 160 to restore normal joint mobility for ADL's. Status at eval: 90 
Current: Improving up to 150 AROM, 160 PROM, MET 
2. Increase right shoulder PROM ER >/= 60 to restore normal joint mobility for ADL's. Status at eval: 0 (@25) Current : at side 50/68 deg A/PROM met 3. Progress with active and strengthening phase per MD protocol. Status at eval: PROM right shoulder only Current : progressing  With repetitions and weight. MET 
  
 
PLAN 
[]  Upgrade activities as tolerated     []  Continue plan of care 
[]  Update interventions per flow sheet [x]  Discharge due to: goals met_ 
[]  Other:_ Angie Maldonado PTA 10/15/2018  1:30 PM 
 
Future Appointments Date Time Provider Maxx Daniels 10/17/2018 8:30 AM Angie Maldonado PTA [facility]

## 2018-10-17 ENCOUNTER — APPOINTMENT (OUTPATIENT)
Dept: PHYSICAL THERAPY | Age: 73
End: 2018-10-17
Payer: MEDICARE

## 2019-02-01 NOTE — PROGRESS NOTES
[facility] 
[address], [address] [facility] [address] [phone]  Fx [phone] Physical Therapy Discharge Summary Patient name: Rafa Harper     Start of Care: 7/10/2018 Referral source: Gilbert Linda MD    : 1945 Medical/Treatment Diagnosis: Right shoulder pain [M25.511]  Onset Date:2018 Prior Hospitalization: see medical history   Provider#: [id number] Comorbidities: DM, arthritis, HTN Prior Level of Function:chronic right shoulder pain with ADL's 
 
 
Medications: Verified on Patient Summary List 
 
Visits from Start of Care: 7    Missed Visits: 3 Reporting Period : 2018 to 10/15/2018 Summary of Care: 
 1. Increase right shoulder PROM flex >/= 160 to restore normal joint mobility for ADL's. Status at eval: 90 
Current: Improving up to 150 AROM, 160 PROM, MET 
2. Increase right shoulder PROM ER >/= 60 to restore normal joint mobility for ADL's. Status at eval: 0 (@25) Current : at side 50/68 deg A/PROM met 3. Progress with active and strengthening phase per MD protocol. Status at eval: PROM right shoulder only Current : progressing  With repetitions and weight. MET 
 
G-Codes (GP) Mobility  Goal  CK= 40-59%  D/C  CK= 40-59% The severity rating is based on clinical judgment and the FOTO score. ASSESSMENT/RECOMMENDATIONS: Patient has met goals set and therefore is ready for discharge at this time. [x]Discontinue therapy: [x]Patient has reached or is progressing toward set goals []Patient is non-compliant or has abdicated 
    []Due to lack of appreciable progress towards set goals Thor Rush 2019 10:54 AM